# Patient Record
Sex: FEMALE | Race: WHITE | NOT HISPANIC OR LATINO | Employment: OTHER | URBAN - NONMETROPOLITAN AREA
[De-identification: names, ages, dates, MRNs, and addresses within clinical notes are randomized per-mention and may not be internally consistent; named-entity substitution may affect disease eponyms.]

---

## 2017-01-09 ENCOUNTER — OFFICE VISIT - GICH (OUTPATIENT)
Dept: OBGYN | Facility: OTHER | Age: 77
End: 2017-01-09

## 2017-01-09 ENCOUNTER — HISTORY (OUTPATIENT)
Dept: OBGYN | Facility: OTHER | Age: 77
End: 2017-01-09

## 2017-01-09 DIAGNOSIS — N39.3 STRESS INCONTINENCE: ICD-10-CM

## 2017-01-09 ASSESSMENT — PATIENT HEALTH QUESTIONNAIRE - PHQ9: SUM OF ALL RESPONSES TO PHQ QUESTIONS 1-9: 3

## 2017-09-13 ENCOUNTER — OFFICE VISIT - GICH (OUTPATIENT)
Dept: OBGYN | Facility: OTHER | Age: 77
End: 2017-09-13

## 2017-09-13 ENCOUNTER — HISTORY (OUTPATIENT)
Dept: OBGYN | Facility: OTHER | Age: 77
End: 2017-09-13

## 2017-09-13 DIAGNOSIS — N39.3 STRESS INCONTINENCE: ICD-10-CM

## 2017-09-18 ENCOUNTER — COMMUNICATION - GICH (OUTPATIENT)
Dept: OBGYN | Facility: OTHER | Age: 77
End: 2017-09-18

## 2017-10-02 ENCOUNTER — COMMUNICATION - GICH (OUTPATIENT)
Dept: INTERNAL MEDICINE | Facility: OTHER | Age: 77
End: 2017-10-02

## 2017-10-02 ENCOUNTER — HISTORY (OUTPATIENT)
Dept: INTERNAL MEDICINE | Facility: OTHER | Age: 77
End: 2017-10-02

## 2017-10-02 ENCOUNTER — OFFICE VISIT - GICH (OUTPATIENT)
Dept: INTERNAL MEDICINE | Facility: OTHER | Age: 77
End: 2017-10-02

## 2017-10-02 DIAGNOSIS — D64.9 ANEMIA: ICD-10-CM

## 2017-10-02 DIAGNOSIS — Z01.818 ENCOUNTER FOR OTHER PREPROCEDURAL EXAMINATION: ICD-10-CM

## 2017-10-02 DIAGNOSIS — R94.31 ABNORMAL ELECTROCARDIOGRAM: ICD-10-CM

## 2017-10-02 DIAGNOSIS — R79.89 OTHER SPECIFIED ABNORMAL FINDINGS OF BLOOD CHEMISTRY: ICD-10-CM

## 2017-10-02 DIAGNOSIS — Z79.899 OTHER LONG TERM (CURRENT) DRUG THERAPY: ICD-10-CM

## 2017-10-02 DIAGNOSIS — R06.81 APNEA, NOT ELSEWHERE CLASSIFIED: ICD-10-CM

## 2017-10-02 DIAGNOSIS — Z23 ENCOUNTER FOR IMMUNIZATION: ICD-10-CM

## 2017-10-02 DIAGNOSIS — I73.9 PERIPHERAL VASCULAR DISEASE (H): ICD-10-CM

## 2017-10-02 LAB
ANION GAP - HISTORICAL: 9 (ref 5–18)
BUN SERPL-MCNC: 26 MG/DL (ref 7–25)
BUN/CREAT RATIO - HISTORICAL: 18
CALCIUM SERPL-MCNC: 9.3 MG/DL (ref 8.6–10.3)
CHLORIDE SERPLBLD-SCNC: 107 MMOL/L (ref 98–107)
CO2 SERPL-SCNC: 26 MMOL/L (ref 21–31)
CREAT SERPL-MCNC: 1.45 MG/DL (ref 0.7–1.3)
ERYTHROCYTE [DISTWIDTH] IN BLOOD BY AUTOMATED COUNT: 13 % (ref 11.5–15.5)
GFR IF NOT AFRICAN AMERICAN - HISTORICAL: 35 ML/MIN/1.73M2
GLUCOSE SERPL-MCNC: 93 MG/DL (ref 70–105)
HCT VFR BLD AUTO: 33 % (ref 33–51)
HEMOGLOBIN: 10.5 G/DL (ref 12–16)
MCH RBC QN AUTO: 29 PG (ref 26–34)
MCHC RBC AUTO-ENTMCNC: 31.8 G/DL (ref 32–36)
MCV RBC AUTO: 91 FL (ref 80–100)
PLATELET # BLD AUTO: 193 THOU/CU MM (ref 140–440)
PMV BLD: 9.3 FL (ref 6.5–11)
POTASSIUM SERPL-SCNC: 4.2 MMOL/L (ref 3.5–5.1)
RED BLOOD COUNT - HISTORICAL: 3.62 MIL/CU MM (ref 4–5.2)
SODIUM SERPL-SCNC: 142 MMOL/L (ref 133–143)
WHITE BLOOD COUNT - HISTORICAL: 4.8 THOU/CU MM (ref 4.5–11)

## 2017-10-02 ASSESSMENT — ANXIETY QUESTIONNAIRES
GAD7 TOTAL SCORE: 0
6. BECOMING EASILY ANNOYED OR IRRITABLE: NOT AT ALL
3. WORRYING TOO MUCH ABOUT DIFFERENT THINGS: NOT AT ALL
5. BEING SO RESTLESS THAT IT IS HARD TO SIT STILL: NOT AT ALL
1. FEELING NERVOUS, ANXIOUS, OR ON EDGE: NOT AT ALL
2. NOT BEING ABLE TO STOP OR CONTROL WORRYING: NOT AT ALL
7. FEELING AFRAID AS IF SOMETHING AWFUL MIGHT HAPPEN: NOT AT ALL
4. TROUBLE RELAXING: NOT AT ALL

## 2017-10-02 ASSESSMENT — PATIENT HEALTH QUESTIONNAIRE - PHQ9: SUM OF ALL RESPONSES TO PHQ QUESTIONS 1-9: 0

## 2017-10-18 ENCOUNTER — HISTORY (OUTPATIENT)
Dept: SURGERY | Facility: OTHER | Age: 77
End: 2017-10-18

## 2017-10-20 ENCOUNTER — HOSPITAL ENCOUNTER (OUTPATIENT)
Dept: RADIOLOGY | Facility: OTHER | Age: 77
End: 2017-10-20
Attending: INTERNAL MEDICINE

## 2017-10-20 ENCOUNTER — TRANSFERRED RECORDS (OUTPATIENT)
Dept: HEALTH INFORMATION MANAGEMENT | Facility: CLINIC | Age: 77
End: 2017-10-20

## 2017-10-20 ENCOUNTER — MEDICAL CORRESPONDENCE (OUTPATIENT)
Facility: CLINIC | Age: 77
End: 2017-10-20
Payer: MEDICARE

## 2017-10-20 ENCOUNTER — AMBULATORY - GICH (OUTPATIENT)
Dept: INTERNAL MEDICINE | Facility: OTHER | Age: 77
End: 2017-10-20

## 2017-10-20 DIAGNOSIS — Z01.818 ENCOUNTER FOR OTHER PREPROCEDURAL EXAMINATION: ICD-10-CM

## 2017-10-20 DIAGNOSIS — R94.31 ABNORMAL ELECTROCARDIOGRAM: ICD-10-CM

## 2017-10-20 DIAGNOSIS — I73.9 PERIPHERAL VASCULAR DISEASE (H): ICD-10-CM

## 2017-10-20 PROCEDURE — 93306 TTE W/DOPPLER COMPLETE: CPT | Mod: 26 | Performed by: INTERNAL MEDICINE

## 2017-10-26 ENCOUNTER — SURGERY (OUTPATIENT)
Dept: SURGERY | Facility: OTHER | Age: 77
End: 2017-10-26

## 2017-10-26 ENCOUNTER — HOSPITAL ENCOUNTER (OUTPATIENT)
Dept: SURGERY | Facility: OTHER | Age: 77
Discharge: HOME | End: 2017-10-26
Attending: OBSTETRICS & GYNECOLOGY | Admitting: OBSTETRICS & GYNECOLOGY

## 2017-10-26 DIAGNOSIS — N39.3 STRESS INCONTINENCE: ICD-10-CM

## 2017-12-28 NOTE — OR NURSING
Patient Information     Patient Name MRN Gail Gu [id number] Female 1940      OR Nursing by Sara Camp RN at 10/26/2017  9:05 AM     Author:  Sara Camp RN Service:  (none) Author Type:  NURS- Registered Nurse     Filed:  10/26/2017  9:44 AM Date of Service:  10/26/2017  9:05 AM Status:  Signed     :  Sara Camp RN (NURS- Registered Nurse)            Handoff report from Heidi Macdonald RN

## 2017-12-28 NOTE — TELEPHONE ENCOUNTER
Patient Information     Patient Name MRN Sex Gail Hilario [id number] Female 1940      Telephone Encounter by Sol Rice DO at 10/2/2017  6:13 PM     Author:  Sol Rice DO Service:  (none) Author Type:  PHYS- Osteopathic     Filed:  10/2/2017  6:15 PM Encounter Date:  10/2/2017 Status:  Signed     :  Sol Rice DO (PHYS- Osteopathic)            Please call patient and let her know that after looking at her chart overall along with her lower extremity edema and some of the changes on her EKG I do think it would be reasonable to get an echocardiogram prior to her surgery.  If she is okay with this I will place the order.    Also, please let patient know her electrolytes look okay.  Her kidney function is slightly elevated and she should have ongoing monitoring of this including after surgery. Hemoglobin and blood counts are stable from 1 year ago. Call if she has any questions.

## 2017-12-28 NOTE — PROGRESS NOTES
Patient Information     Patient Name MRN Gail Gu [id number] Female 1940      Progress Notes by Sol Rice DO at 10/2/2017 10:20 AM     Author:  Sol Rice DO Service:  (none) Author Type:  PHYS- Osteopathic     Filed:  10/6/2017  7:37 AM Encounter Date:  10/2/2017 Status:  Signed     :  Sol Rice DO (PHYS- Osteopathic)            Please see H&P from same date.

## 2017-12-28 NOTE — INTERVAL H&P NOTE
Patient Information     Patient Name MRN Gail Gu [id number] Female 1940      Interval H&P Note by Boris Guerra MD at 10/26/2017  8:40 AM     Author:  Boris Guerra MD Service:  (none) Author Type:  Physician     Filed:  10/26/2017  8:40 AM Date of Service:  10/26/2017  8:40 AM Status:  Signed     :  Boris Guerra MD (Physician)            I have seen and examined the patient and there are no changes to her history and physical exam.    Boris Guerra MD        Source Note     Author:  Sol Rice DO Service:  (none) Author Type:  PHYS- Osteopathic    Filed:  10/24/2017 11:51 AM Date of Service:  10/2/2017 10:20 AM Status:  Signed    :  Sol Rice DO (PHYS- Osteopathic)              ----------------- PREOPERATIVE EXAM ------------------  10/2/2017    HPI:  Gail Perry is a 77 y.o. female here for preoperative optimization requested by Dr. Guerra due to medical comorbidity including history of anemia, peripheral vascular disease, chronic pain.  She overall is feeling good and denies any concerns.  She has not had any new chest pain, SOB, palpitations, dyspnea on exertion, cough/cold symptoms, orthopnea or LE edema.  She is not limited in exercise due to chest pain, SOB or other cardiopulmonary symptoms although is limited in the amount of activity she participates in.    She does have a history of peripheral vascular disease.  She is currently on methadone for her pain.  She does report that the decrease in sensation is on her feet and up to her mid calf.  She denies any sores or concerns in this area although does have a history of osteomyelitis with ulceration and MRSA.  He does feel that the methadone controls her pain at this time and she does take 30-40 mg twice daily as needed.  She does have a prior EKG that shows inferior infarct that was age indeterminate.  This was back in .    She has a history of anemia.  Outside records are  reviewed.  Her hemoglobin was last checked a year ago.  At that time it was 10.5.  This has been stable for her since approximately 2011.  She was on iron replacement for a period of time however no longer is taking this.    She does report today that she has been having some episodes of apnea.  She does report having done a polysomnogram and was told she had borderline sleep apnea however she was never prescribed a CPAP.    Date of Surgery: October 26, 2017  Type of Surgery: Placement of a bladder neck sling  Surgeon: Dr. Guerra  [facility]:  [facility] and hospital    Fever/Chills or other infectious symptoms in past month? no  >10lbs weight loss in the past 2 months? no  No prolonged steroid use in past year.  Recent use of: aspirin  Health Care Directive on file? no  History of blood transfusions? Yes - tolerated in past  Td up to date? 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  Yes - she has had some difficulty awakening from anesthetic however has not had any issues since the early 1980s  History of blood clots, bleeding issues or anesthetic problems in immediate family members?  no    Obstructive Sleep Apnea screening    History of diagnosis of MARYAM?  Reportedly borderline  Do you snore loudly? yes  Has anyone ever observed you stop breathing during your sleep? yes   Do you have or are you being treated for high blood pressure? yes    Is BMI >35? no  Is Pt > 50 years old? yes  Neck circumference > > 15.75 inches/40cm? yes  Male? no     Total yes answers in MARYAM section: 5    Low Risk 0-2  At Risk 3-4  High Risk 5-8        ROS  GEN: -fevers/-chills/-night sweats/-wt change  NEURO: -headaches/-vision changes  EARS: -hearing/-tinnitus  NOSE: -drainage/-congestion  MOUTH/THROAT: - sore throat/-dysphagia/-sores  LUNGS: -sob/-cough  CARDIOVASCULAR: -cp/-palpitations  GI: -pain/-diarrhea/-constipation/-bloody stools  :  -dysuria/-hematuria  HEMATOLOGIC/LYMPHATIC: -swollen nodes/-easy bruising  SKIN: -rashes/-lesions  MSK/RHEUM: +joint pain/-swelling/-falls  NEURO: -weakness/+parasthesias  PSYCH: -anhedonia/+depression/-anxiety        History is discussed on 10/2/2017 with patient and reviewed in available records by myself.  It is current to the best of my knowledge as below.    Past Medical History:     Diagnosis  Date     Anemia      Anesthesia     hx difficulty awakening from surgery many years ago (1980)- no problem with recent surgeries      Bilateral bunions     surgery for right      COPD (chronic obstructive pulmonary disease) (HC)     mild      Depression      Epiretinal membrane, right eye      GERD (gastroesophageal reflux disease)     gastritis      History of blood transfusion 1980    with hysterectomy      HSV (herpes simplex virus) infection     cold sores - none for years      HTN (hypertension)      Hyperlipidemia      Hypothyroidism      Intermittent claudication (HC)      Lumbar back pain      Macular degeneration     OD      MRSA (methicillin resistant Staphylococcus aureus) 8/2011    toe L foot ulcerations      Nevus of neck     right neck      Osteoarthrosis 2012    right femoral head, resection      Osteopenia      Peripheral neuropathy, idiopathic     ideopathic- chronic pain contract- appts every other month; feet and legs      Peripheral vascular disease (HC)     in bilateral legs      Renal failure      Thyroid nodule     noted on US 2/2012, repeat 11/2012 and stable- no f/u recommended      Urinary frequency 2006    urinary incontinence        Past Surgical History:      Procedure  Laterality Date     ANTERIOR AND POSTERIOR VAGINAL REPAIR      x2; done in [facility]        APPENDECTOMY       BIOPSY  9/14/2012     gastric mild reactive gastropathy, neg for H-pylori       BUNIONECTOMY  10/06    Right foot- [facility]       CHOLECYSTECTOMY  9/2010    [facility]       COLONOSCOPY  2/05, 2010      ESOPHAGOGASTRODUODENOSCOPY  9/13/2012    Dr. Main Bullock- for GI bleed       KNEE ARTHROSCOPY Left 2008     PREMALIG/BENIGN SKIN LESION EXCISION  4/2001    excision nevus right neck        right femoral head resection  5/7/2012    from osteoarthrosis       ELISE AND BSO  1980    bleeding- with hx of blood transfusion       TOE AMPUTATION Left 08/20/2015    Left foot lesser toe - Gwendolyn, 2/24/2016 - Dr Yee       TOTAL HIP ARTHROPLASTY Right 05/07/2012       Current Outpatient Prescriptions     Medication  Sig     acetaminophen (TYLENOL) 325 mg tablet Take 650 mg by mouth every 6 hours if needed. Max acetaminophen dose: 4000mg in 24 hrs.     albuterol (PROVENTIL) 0.083 % neb solution Inhale 3 mL via a nebulizer 4 times daily.     aspirin enteric coated 81 mg tablet Take 81 mg by mouth once daily with a meal.     Calcium-Cholecalciferol, D3, 600 mg calcium- 200 unit cap Take 1 Tab by mouth 2 times daily.     docusate (COLACE) 100 mg tablet Take 100 mg by mouth once daily. Indications: CONSTIPATION     furosemide (LASIX) 20 mg tablet Take 1 tablet by mouth once daily if needed for Other (Specify).     levothyroxine (SYNTHROID) 75 mcg tablet Take 75 mcg by mouth once daily.     lisinopril (PRINIVIL; ZESTRIL) 20 mg tablet Take 20 mg by mouth once daily.     methadone (DOLOPHINE) 10 mg tablet 30-40mg twice daily as needed for pain.     omeprazole (PRILOSEC) 20 mg capsule Take 20 mg by mouth once daily before a meal.     PARoxetine (PAXIL) 40 mg tablet Take 40 mg by mouth once daily.     polyethylene glycoL (MIRALAX) 17 gram/dose powder Take 17 g by mouth once daily.     tiotropium (SPIRIVA) 18 mcg inhalation capsule Inhale 18 mcg by mouth once daily. As needed.     No current facility-administered medications for this visit.      Medications have been reviewed by me and are current to the best of my knowledge and ability.       Allergies      Allergen   Reactions     Latex  Hives and Angioedema     Affected around mouth  "while at dental office (when latex gloves used)      Nickel  *Unknown     rash      Penicillins  Hives     Sulfa (Sulfonamide Antibiotics)  Other - Describe In Comment Field     Yellow around eyes; ? Liver issues        Social History     Social History        Marital status:       Spouse name: N/A     Number of children:  N/A     Years of education:  N/A     Social History Main Topics         Smoking status:   Never Smoker     Smokeless tobacco:   Never Used     Alcohol use   Yes      Comment: once a year      Drug use:   No     Sexual activity:   Not Currently     Other Topics  Concern     Not on file      Social History Narrative     Lives with her , Guero.  Lives in [place].  Three daughters, two live [place] of [place] and one in [facility].               Family Status     Relation  Status     Mother      Father      Daughter Alive     Sister      Daughter Alive     Daughter Alive       Family History       Problem   Relation Age of Onset     Heart Disease  Mother      Hypertension  Father      Other  Father      Pulmonary Emboli       No Known Problems  Daughter      Cancer-breast  Sister      Other  Sister      adopted sister       Cancer-breast  Daughter      total mastectomy Age 36       Other  Daughter      OP cerebellar atrophy         -------------------------------------------------------------    PHYSICAL EXAM:  /90  Pulse 64  Ht 1.562 m (5' 1.5\")  Wt 83 kg (182 lb 14.4 oz)  SpO2 91%  BMI 34 kg/m2  Estimated body mass index is 34 kg/(m^2) as calculated from the following:    Height as of this encounter: 1.562 m (5' 1.5\").    Weight as of this encounter: 83 kg (182 lb 14.4 oz).      GEN: Vitals reviewed.  Patient is in no acute distress.  Cooperative with exam.  HEENT: Normocephalic atraumatic. Normal external eye, conjunctiva, lids, cornea. Pupils equally round. No scleral icterus or conjunctival erythema. Oropharynx with no erythema or exudates or " sores.   NECK: Supple; no thyromegaly or masses noted.  No cervical or supraclavicular lymphadenopathy.  CV: Heart regular in rate and rhythm with no murmur. Radial and posterior tibial pulses palpable.  LUNGS: Lungs clear to auscultation bilaterally.  Chest rise equal bilaterally. No accessory muscle use.  ABD: No CVA tenderness. Soft, nontender, and nondistended. No rebound. Bowel sounds positive.  SKIN: Warm and dry to touch.  No rash on face, arms and legs.  EXT: No clubbing or cyanosis.  Trace to 1 plus bilateral lower extremity peripheral edema.  Worse on the right than the left.    NEURO: Alert and oriented to person, place, and time. Cranial nerves II-XII grossly intact with no focal or lateralizing deficits.  Muscle tone normal.  Gait normal. No tremor.  Sensation intact to light touch.  MSK: Back is straight, full ROM of upper and lower extremities.  PSYCH: Affect appropriate.  Appropriate and linear thought processes.      CXR:  Not indicated    EKG: 10/2/2017 -Personally ordered/reviewed - Normal sinus rhythm with a rate of 64.  Q waves noted in leads 3 and aVF.  ST segment abnormality with T-wave inversion in V2 and V3 and flattening through V5.  QRS, QTC and NC intervals are all normal.  Possible inferior infarct, age indeterminate.  This is unchanged from 2015.    LABS: Personally ordered and reviewed as below.  ---------------------------------------------------------------  Results for orders placed or performed in visit on 10/02/17      CBC W PLT NO DIFF      Result  Value Ref Range    WHITE BLOOD COUNT         4.8 4.5 - 11.0 thou/cu mm    RED BLOOD COUNT           3.62 (L) 4.00 - 5.20 mil/cu mm    HEMOGLOBIN                10.5 (L) 12.0 - 16.0 g/dL    HEMATOCRIT                33.0 33.0 - 51.0 %    MCV                       91 80 - 100 fL    MCH                       29.0 26.0 - 34.0 pg    MCHC                      31.8 (L) 32.0 - 36.0 g/dL    RDW                       13.0 11.5 - 15.5 %     PLATELET COUNT            193 140 - 440 thou/cu mm    MPV                       9.3 6.5 - 11.0 fL   BASIC METABOLIC PANEL      Result  Value Ref Range    SODIUM 142 133 - 143 mmol/L    POTASSIUM 4.2 3.5 - 5.1 mmol/L    CHLORIDE 107 98 - 107 mmol/L    CO2,TOTAL 26 21 - 31 mmol/L    ANION GAP 9 5 - 18                    GLUCOSE 93 70 - 105 mg/dL    CALCIUM 9.3 8.6 - 10.3 mg/dL    BUN 26 (H) 7 - 25 mg/dL    CREATININE 1.45 (H) 0.70 - 1.30 mg/dL    BUN/CREAT RATIO           18                    GFR if African American 43 (L) >60 ml/min/1.73m2    GFR if not African American 35 (L) >60 ml/min/1.73m2       ASSESSEMENT AND PLAN:    Preoperative examination  - Patient is able to tolerate greater than 4 METs of activity without any cardiopulmonary symptoms.   - The patient has RCRI risk factors of 1  - ASA PS class 2  - at this time given that she has had abnormalities on her EKG since 2015 with no further follow-up I do think it is reasonable to obtain an echocardiogram.  If this is normal with no significant wall motion abnormalities I would not recommend any further testing at this time.  If significant abnormality is noted it would be recommended to be seen through cardiology prior to surgery.    PRE OP RECOMMENDATIONS:  - Patient is on chronic pain medications (YES) and plan is decrease as able prior to surgery.  Resume treatment with her primary care provider postop.  - Patient is on antiplatlet/anticoagulation (YES) and plan is hold prior  - Other medications that need adjustment perioperatively (NO) - although it was recommended to try to reduce her methadone dose leading up to surgery.   - Other: Patient was advised to call our office and the surgical services with any change in condition, concerns or new symptoms that develop between today and their surgical date. Especially any cardiopulmonary symptoms or symptoms concerning for an infection.      Diagnoses and all orders for this visit:    Preoperative  examination  -     MS ELECTROCARDIOGRAM TRACING  -     EKG 12 LEAD UNIT PERFORMED  -     ECHO COMPLETE W CONTRAST; Future    Anemia, unspecified type  -     stable from prior at this time.  -     CBC W PLT NO DIFF    Apnea  - given her episodes of apnea she very well may need post op noninvasive ventilation.  I question if this is secondary to her methadone and medication use.  I would recommend reducing these as able long-term and considering a polysomnogram at some point in the future through her primary care provider.    PVD (peripheral vascular disease) (HC)  -     MS ELECTROCARDIOGRAM TRACING  -     EKG 12 LEAD UNIT PERFORMED  -     ECHO COMPLETE W CONTRAST; Future    Abnormal EKG  - echo ordered due to persistent abnormal EKG with no interval cardiac workup as above.      - ECHO COMPLETE W CONTRAST; Future    Elevated serum creatinine        - she is encouraged to increase her water intake overall within reason.  She needs to avoid any nonsteroidal anti-inflammatory medications.  I would recommend postop assessment of renal function.    High risk medication use  -     BASIC METABOLIC PANEL    Need for influenza vaccination  -     FLU VACCINE => 65 YRS HIGH DOSE TRIVALENT IIV3 IM  -     MS ADMIN VACC INITIAL        Patient Instructions   PRE OP RECOMMENDATIONS:  -- Hold aspirin for 7 days before surgery  -- Hold Advil/ibuprofen, Aleve/naproxen, for 7 days prior to surgery  -- Hold vitamins and supplements for 2 weeks prior to surgery  -- No need to hold regular meds the day of surgery.  Consider decreasing methadone to 30mg twice daily for the week leading up to surgery if able  -- Okay to use Tylenol (Acetaminophen)  -- No food or drink after midnight the night before surgery    Please call our office and the surgical services with any change in condition or new symptoms that develop between today and your surgical date, in particular if you have any new chest pain, shortness of breath, swelling or fevers.            KLEVER CAZARES DO  10/2/2017 11:14 AM      ADDENDUM:  10/24/2017   11:50 AM    Echocardiogram is reviewed.  No wall motion abnormalities or other concerns noted.  Patient is scheduled for surgery later this week and there is no further intervention needed prior.    KLEVER CAZARES DO

## 2017-12-28 NOTE — H&P
Patient Information     Patient Name MRN Gail Gu [id number] Female 1940      H&P (View-Only) by Sol Rice DO at 10/2/2017 10:20 AM     Author:  Sol Rice DO Service:  (none) Author Type:  PHYS- Osteopathic     Filed:  10/24/2017 11:51 AM Date of Service:  10/2/2017 10:20 AM Status:  Signed     :  Sol Rice DO (PHYS- Osteopathic)            ----------------- PREOPERATIVE EXAM ------------------  10/2/2017    HPI:  Gail Perry is a 77 y.o. female here for preoperative optimization requested by Dr. Guerra due to medical comorbidity including history of anemia, peripheral vascular disease, chronic pain.  She overall is feeling good and denies any concerns.  She has not had any new chest pain, SOB, palpitations, dyspnea on exertion, cough/cold symptoms, orthopnea or LE edema.  She is not limited in exercise due to chest pain, SOB or other cardiopulmonary symptoms although is limited in the amount of activity she participates in.    She does have a history of peripheral vascular disease.  She is currently on methadone for her pain.  She does report that the decrease in sensation is on her feet and up to her mid calf.  She denies any sores or concerns in this area although does have a history of osteomyelitis with ulceration and MRSA.  He does feel that the methadone controls her pain at this time and she does take 30-40 mg twice daily as needed.  She does have a prior EKG that shows inferior infarct that was age indeterminate.  This was back in .    She has a history of anemia.  Outside records are reviewed.  Her hemoglobin was last checked a year ago.  At that time it was 10.5.  This has been stable for her since approximately .  She was on iron replacement for a period of time however no longer is taking this.    She does report today that she has been having some episodes of apnea.  She does report having done a polysomnogram and was told she had  borderline sleep apnea however she was never prescribed a CPAP.    Date of Surgery: October 26, 2017  Type of Surgery: Placement of a bladder neck sling  Surgeon: Dr. Guerra  [facility]:  [facility] and hospital    Fever/Chills or other infectious symptoms in past month? no  >10lbs weight loss in the past 2 months? no  No prolonged steroid use in past year.  Recent use of: aspirin  Health Care Directive on file? no  History of blood transfusions? Yes - tolerated in past  Td up to date? 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  Yes - she has had some difficulty awakening from anesthetic however has not had any issues since the early 1980s  History of blood clots, bleeding issues or anesthetic problems in immediate family members?  no    Obstructive Sleep Apnea screening    History of diagnosis of MARYAM?  Reportedly borderline  Do you snore loudly? yes  Has anyone ever observed you stop breathing during your sleep? yes   Do you have or are you being treated for high blood pressure? yes    Is BMI >35? no  Is Pt > 50 years old? yes  Neck circumference > > 15.75 inches/40cm? yes  Male? no     Total yes answers in MARYAM section: 5    Low Risk 0-2  At Risk 3-4  High Risk 5-8        ROS  GEN: -fevers/-chills/-night sweats/-wt change  NEURO: -headaches/-vision changes  EARS: -hearing/-tinnitus  NOSE: -drainage/-congestion  MOUTH/THROAT: - sore throat/-dysphagia/-sores  LUNGS: -sob/-cough  CARDIOVASCULAR: -cp/-palpitations  GI: -pain/-diarrhea/-constipation/-bloody stools  : -dysuria/-hematuria  HEMATOLOGIC/LYMPHATIC: -swollen nodes/-easy bruising  SKIN: -rashes/-lesions  MSK/RHEUM: +joint pain/-swelling/-falls  NEURO: -weakness/+parasthesias  PSYCH: -anhedonia/+depression/-anxiety        History is discussed on 10/2/2017 with patient and reviewed in available records by myself.  It is current to the best of my knowledge as below.    Past  Medical History:     Diagnosis  Date     Anemia      Anesthesia     hx difficulty awakening from surgery many years ago (1980)- no problem with recent surgeries      Bilateral bunions     surgery for right      COPD (chronic obstructive pulmonary disease) (HC)     mild      Depression      Epiretinal membrane, right eye      GERD (gastroesophageal reflux disease)     gastritis      History of blood transfusion 1980    with hysterectomy      HSV (herpes simplex virus) infection     cold sores - none for years      HTN (hypertension)      Hyperlipidemia      Hypothyroidism      Intermittent claudication (HC)      Lumbar back pain      Macular degeneration     OD      MRSA (methicillin resistant Staphylococcus aureus) 8/2011    toe L foot ulcerations      Nevus of neck     right neck      Osteoarthrosis 2012    right femoral head, resection      Osteopenia      Peripheral neuropathy, idiopathic     ideopathic- chronic pain contract- appts every other month; feet and legs      Peripheral vascular disease (HC)     in bilateral legs      Renal failure      Thyroid nodule     noted on US 2/2012, repeat 11/2012 and stable- no f/u recommended      Urinary frequency 2006    urinary incontinence        Past Surgical History:      Procedure  Laterality Date     ANTERIOR AND POSTERIOR VAGINAL REPAIR      x2; done in [facility]        APPENDECTOMY       BIOPSY  9/14/2012     gastric mild reactive gastropathy, neg for H-pylori       BUNIONECTOMY  10/06    Right foot- Pawel       CHOLECYSTECTOMY  9/2010    Ara       COLONOSCOPY  2/05, 2010     ESOPHAGOGASTRODUODENOSCOPY  9/13/2012    Dr. Main Bullock- for GI bleed       KNEE ARTHROSCOPY Left 2008     PREMALIG/BENIGN SKIN LESION EXCISION  4/2001    excision nevus right neck        right femoral head resection  5/7/2012    from osteoarthrosis       ELISE AND BSO  1980    bleeding- with hx of blood transfusion       TOE AMPUTATION Left 08/20/2015    Left foot lesser toe -  Gwendolyn, 2/24/2016 - Dr Yee       TOTAL HIP ARTHROPLASTY Right 05/07/2012       Current Outpatient Prescriptions     Medication  Sig     acetaminophen (TYLENOL) 325 mg tablet Take 650 mg by mouth every 6 hours if needed. Max acetaminophen dose: 4000mg in 24 hrs.     albuterol (PROVENTIL) 0.083 % neb solution Inhale 3 mL via a nebulizer 4 times daily.     aspirin enteric coated 81 mg tablet Take 81 mg by mouth once daily with a meal.     Calcium-Cholecalciferol, D3, 600 mg calcium- 200 unit cap Take 1 Tab by mouth 2 times daily.     docusate (COLACE) 100 mg tablet Take 100 mg by mouth once daily. Indications: CONSTIPATION     furosemide (LASIX) 20 mg tablet Take 1 tablet by mouth once daily if needed for Other (Specify).     levothyroxine (SYNTHROID) 75 mcg tablet Take 75 mcg by mouth once daily.     lisinopril (PRINIVIL; ZESTRIL) 20 mg tablet Take 20 mg by mouth once daily.     methadone (DOLOPHINE) 10 mg tablet 30-40mg twice daily as needed for pain.     omeprazole (PRILOSEC) 20 mg capsule Take 20 mg by mouth once daily before a meal.     PARoxetine (PAXIL) 40 mg tablet Take 40 mg by mouth once daily.     polyethylene glycoL (MIRALAX) 17 gram/dose powder Take 17 g by mouth once daily.     tiotropium (SPIRIVA) 18 mcg inhalation capsule Inhale 18 mcg by mouth once daily. As needed.     No current facility-administered medications for this visit.      Medications have been reviewed by me and are current to the best of my knowledge and ability.       Allergies      Allergen   Reactions     Latex  Hives and Angioedema     Affected around mouth while at dental office (when latex gloves used)      Nickel  *Unknown     rash      Penicillins  Hives     Sulfa (Sulfonamide Antibiotics)  Other - Describe In Comment Field     Yellow around eyes; ? Liver issues        Social History     Social History        Marital status:       Spouse name: N/A     Number of children:  N/A     Years of education:  N/A     Social  "History Main Topics         Smoking status:   Never Smoker     Smokeless tobacco:   Never Used     Alcohol use   Yes      Comment: once a year      Drug use:   No     Sexual activity:   Not Currently     Other Topics  Concern     Not on file      Social History Narrative     Lives with her , Guero.  Lives in [place].  Three daughters, two live north of [place] and one in the [facility].               Family Status     Relation  Status     Mother      Father      Daughter Alive     Sister      Daughter Alive     Daughter Alive       Family History       Problem   Relation Age of Onset     Heart Disease  Mother      Hypertension  Father      Other  Father      Pulmonary Emboli       No Known Problems  Daughter      Cancer-breast  Sister      Other  Sister      adopted sister       Cancer-breast  Daughter      total mastectomy Age 36       Other  Daughter      OP cerebellar atrophy         -------------------------------------------------------------    PHYSICAL EXAM:  /90  Pulse 64  Ht 1.562 m (5' 1.5\")  Wt 83 kg (182 lb 14.4 oz)  SpO2 91%  BMI 34 kg/m2  Estimated body mass index is 34 kg/(m^2) as calculated from the following:    Height as of this encounter: 1.562 m (5' 1.5\").    Weight as of this encounter: 83 kg (182 lb 14.4 oz).      GEN: Vitals reviewed.  Patient is in no acute distress.  Cooperative with exam.  HEENT: Normocephalic atraumatic. Normal external eye, conjunctiva, lids, cornea. Pupils equally round. No scleral icterus or conjunctival erythema. Oropharynx with no erythema or exudates or sores.   NECK: Supple; no thyromegaly or masses noted.  No cervical or supraclavicular lymphadenopathy.  CV: Heart regular in rate and rhythm with no murmur. Radial and posterior tibial pulses palpable.  LUNGS: Lungs clear to auscultation bilaterally.  Chest rise equal bilaterally. No accessory muscle use.  ABD: No CVA tenderness. Soft, nontender, and nondistended. No " rebound. Bowel sounds positive.  SKIN: Warm and dry to touch.  No rash on face, arms and legs.  EXT: No clubbing or cyanosis.  Trace to 1 plus bilateral lower extremity peripheral edema.  Worse on the right than the left.    NEURO: Alert and oriented to person, place, and time. Cranial nerves II-XII grossly intact with no focal or lateralizing deficits.  Muscle tone normal.  Gait normal. No tremor.  Sensation intact to light touch.  MSK: Back is straight, full ROM of upper and lower extremities.  PSYCH: Affect appropriate.  Appropriate and linear thought processes.      CXR:  Not indicated    EKG: 10/2/2017 -Personally ordered/reviewed - Normal sinus rhythm with a rate of 64.  Q waves noted in leads 3 and aVF.  ST segment abnormality with T-wave inversion in V2 and V3 and flattening through V5.  QRS, QTC and WI intervals are all normal.  Possible inferior infarct, age indeterminate.  This is unchanged from 2015.    LABS: Personally ordered and reviewed as below.  ---------------------------------------------------------------  Results for orders placed or performed in visit on 10/02/17      CBC W PLT NO DIFF      Result  Value Ref Range    WHITE BLOOD COUNT         4.8 4.5 - 11.0 thou/cu mm    RED BLOOD COUNT           3.62 (L) 4.00 - 5.20 mil/cu mm    HEMOGLOBIN                10.5 (L) 12.0 - 16.0 g/dL    HEMATOCRIT                33.0 33.0 - 51.0 %    MCV                       91 80 - 100 fL    MCH                       29.0 26.0 - 34.0 pg    MCHC                      31.8 (L) 32.0 - 36.0 g/dL    RDW                       13.0 11.5 - 15.5 %    PLATELET COUNT            193 140 - 440 thou/cu mm    MPV                       9.3 6.5 - 11.0 fL   BASIC METABOLIC PANEL      Result  Value Ref Range    SODIUM 142 133 - 143 mmol/L    POTASSIUM 4.2 3.5 - 5.1 mmol/L    CHLORIDE 107 98 - 107 mmol/L    CO2,TOTAL 26 21 - 31 mmol/L    ANION GAP 9 5 - 18                    GLUCOSE 93 70 - 105 mg/dL    CALCIUM 9.3 8.6 - 10.3 mg/dL     BUN 26 (H) 7 - 25 mg/dL    CREATININE 1.45 (H) 0.70 - 1.30 mg/dL    BUN/CREAT RATIO           18                    GFR if African American 43 (L) >60 ml/min/1.73m2    GFR if not African American 35 (L) >60 ml/min/1.73m2       ASSESSEMENT AND PLAN:    Preoperative examination  - Patient is able to tolerate greater than 4 METs of activity without any cardiopulmonary symptoms.   - The patient has RCRI risk factors of 1  - ASA PS class 2  - at this time given that she has had abnormalities on her EKG since 2015 with no further follow-up I do think it is reasonable to obtain an echocardiogram.  If this is normal with no significant wall motion abnormalities I would not recommend any further testing at this time.  If significant abnormality is noted it would be recommended to be seen through cardiology prior to surgery.    PRE OP RECOMMENDATIONS:  - Patient is on chronic pain medications (YES) and plan is decrease as able prior to surgery.  Resume treatment with her primary care provider postop.  - Patient is on antiplatlet/anticoagulation (YES) and plan is hold prior  - Other medications that need adjustment perioperatively (NO) - although it was recommended to try to reduce her methadone dose leading up to surgery.   - Other: Patient was advised to call our office and the surgical services with any change in condition, concerns or new symptoms that develop between today and their surgical date. Especially any cardiopulmonary symptoms or symptoms concerning for an infection.      Diagnoses and all orders for this visit:    Preoperative examination  -     MA ELECTROCARDIOGRAM TRACING  -     EKG 12 LEAD UNIT PERFORMED  -     ECHO COMPLETE W CONTRAST; Future    Anemia, unspecified type  -     stable from prior at this time.  -     CBC W PLT NO DIFF    Apnea  - given her episodes of apnea she very well may need post op noninvasive ventilation.  I question if this is secondary to her methadone and medication use.  I would  recommend reducing these as able long-term and considering a polysomnogram at some point in the future through her primary care provider.    PVD (peripheral vascular disease) (HC)  -     PA ELECTROCARDIOGRAM TRACING  -     EKG 12 LEAD UNIT PERFORMED  -     ECHO COMPLETE W CONTRAST; Future    Abnormal EKG  - echo ordered due to persistent abnormal EKG with no interval cardiac workup as above.      - ECHO COMPLETE W CONTRAST; Future    Elevated serum creatinine        - she is encouraged to increase her water intake overall within reason.  She needs to avoid any nonsteroidal anti-inflammatory medications.  I would recommend postop assessment of renal function.    High risk medication use  -     BASIC METABOLIC PANEL    Need for influenza vaccination  -     FLU VACCINE => 65 YRS HIGH DOSE TRIVALENT IIV3 IM  -     PA ADMIN VACC INITIAL        Patient Instructions   PRE OP RECOMMENDATIONS:  -- Hold aspirin for 7 days before surgery  -- Hold Advil/ibuprofen, Aleve/naproxen, for 7 days prior to surgery  -- Hold vitamins and supplements for 2 weeks prior to surgery  -- No need to hold regular meds the day of surgery.  Consider decreasing methadone to 30mg twice daily for the week leading up to surgery if able  -- Okay to use Tylenol (Acetaminophen)  -- No food or drink after midnight the night before surgery    Please call our office and the surgical services with any change in condition or new symptoms that develop between today and your surgical date, in particular if you have any new chest pain, shortness of breath, swelling or fevers.           KLEVER CAZARES DO  10/2/2017 11:14 AM      ADDENDUM:  10/24/2017   11:50 AM    Echocardiogram is reviewed.  No wall motion abnormalities or other concerns noted.  Patient is scheduled for surgery later this week and there is no further intervention needed prior.    KLEVER CAZARES DO

## 2017-12-28 NOTE — PROGRESS NOTES
Patient Information     Patient Name MRN Sex Gali Hilario [id number] Female 1940      Progress Notes by Boris Guerra MD at 2017 10:15 AM     Author:  Boris Guerra MD Service:  (none) Author Type:  Physician     Filed:  2017 11:38 AM Encounter Date:  2017 Status:  Signed     :  Boris Guerra MD (Physician)            URINARY INCONTINENCE - GYN    Gail Perry is a 77 y.o. female, who returns with a persistent complaint of urinary incontinence for a duration of many years.  She has been seen by Dr Joy in Urology and underwent urodynamics.  The results showed mixed incontinence with primarily a stress component.  We met last January and reviewed her workup.  We also discussed options for care and she would like to proceed with a mini-arc mid-urethral sling.    SUBJECTIVE    Symptoms occur when laughing, coughing or sneezing.  Urine stream is described as weak and considered by the patient as worsening.    Are panty liners, pads or disposable undergarments required?  Yes    Associated Symptoms    Dysuria:  No  Vaginal Irritation/Dryness:  No  Hematuria:  No  Nocturia:  Yes - few times a night  Urgency:  No  Fever:  No    Previous tests/procedures performed were voiding cystourethrogram    Past Medical History includes neurologic disease.      Past Medical History:     Diagnosis  Date     Anemia      Anesthesia     hx difficulty awakening from surgery many years ago ()- no problem with recent surgeries      Bilateral bunions     surgery for right      Chronic pain      COPD (chronic obstructive pulmonary disease) (HC)     mild      Depression      Epiretinal membrane, right eye      GERD (gastroesophageal reflux disease)     gastritis      History of blood transfusion     with hysterectomy      History of blood transfusion     with hysterectomy      HSV (herpes simplex virus) infection      HTN (hypertension)      Hyperlipidemia       "Hypothyroidism      Intermittent claudication (HC)      Lumbar back pain      Macular degeneration     OD      MRSA (methicillin resistant Staphylococcus aureus) 8/2011    toe L foot ulcerations      Nevus of neck     right neck      Osteoarthrosis 2012    right femoral head, resection      Osteopenia      Peripheral neuropathy (HC)     Feet      Peripheral neuropathy, idiopathic     ideopathic- chronic pain contract- appts every other month      Peripheral vascular disease (HC)      Renal failure      Renal insufficiency     Progressed to renal failure      Thyroid nodule     noted on US 2/2012, repeat 11/2012 and stable- no f/u recommended      Urinary frequency 2006    urinary incontinence      UTI (urinary tract infection)        OBJECTIVE    ROS    See HPI.     PHYSICAL EXAM    /90  Pulse 72  Ht 1.588 m (5' 2.5\")  Wt 82.5 kg (181 lb 12.8 oz)  BMI 32.72 kg/m2  General Appearance:  Alert, appropriate appearance for age. No acute distress  Psychiatric Exam: Alert and oriented, appropriate affect.    ASSESSMENT    The clinical evaluation of the patient's signs and symptoms indicate that the urinary incontinence may be due to stress incontinence.    PLAN    Schedule for extended stay versus outpatient surgery.  Due to her advanced years and medical problems, I asked her to see Dr Rice for pre-operative optimization and recommendations for mayte-operative care.  She agrees with this plan.        "

## 2017-12-28 NOTE — OR ANESTHESIA
Patient Information     Patient Name MRN Sex     Gail Perry [id number] Female 1940      OR Anesthesia by Elsi Gonzalez MD at 10/26/2017  8:50 AM     Author:  Elsi Gonzalez MD Service:  (none) Author Type:  PHYS- Anesthesiologist     Filed:  10/26/2017  8:51 AM Date of Service:  10/26/2017  8:50 AM Status:  Signed     :  Elsi Gonzalez MD (PHYS- Anesthesiologist)                                                           ANESTHESIA ASSESSMENT    Date: 10/26/17 Time: 8:50 AM      Patient:  Gail Perry    Procedure(s) (LRB):  Place Bladder Neck Sling (N/A)    Past Medical History:     Diagnosis  Date     Anemia      Anesthesia     hx difficulty awakening from surgery many years ago ()- no problem with recent surgeries      Bilateral bunions     surgery for right      COPD (chronic obstructive pulmonary disease) (HC)     mild      Depression      Epiretinal membrane, right eye      GERD (gastroesophageal reflux disease)     gastritis      History of blood transfusion     with hysterectomy      HSV (herpes simplex virus) infection     cold sores - none for years      HTN (hypertension)      Hyperlipidemia      Hypothyroidism      Intermittent claudication (HC)      Lumbar back pain      Macular degeneration     OD      MRSA (methicillin resistant Staphylococcus aureus) 2011    toe L foot ulcerations      Nevus of neck     right neck      Osteoarthrosis     right femoral head, resection      Osteopenia      Peripheral neuropathy, idiopathic     ideopathic- chronic pain contract- appts every other month; feet and legs      Peripheral vascular disease (HC)     in bilateral legs      Renal failure      Thyroid nodule     noted on US 2012, repeat 2012 and stable- no f/u recommended      Urinary frequency     urinary incontinence        Past Surgical History:      Procedure  Laterality Date     ANTERIOR AND POSTERIOR VAGINAL REPAIR      x2; done in  [facility]        APPENDECTOMY       BIOPSY  9/14/2012     gastric mild reactive gastropathy, neg for H-pylori       BUNIONECTOMY  10/06    Right foot- Pawel       CHOLECYSTECTOMY  9/2010    Ara       COLONOSCOPY  2/05, 2010     ESOPHAGOGASTRODUODENOSCOPY  9/13/2012    Dr. Main Bullock- for GI bleed       KNEE ARTHROSCOPY Left 2008     PREMALIG/BENIGN SKIN LESION EXCISION  4/2001    excision nevus right neck        right femoral head resection  5/7/2012    from osteoarthrosis       ELISE AND BSO  1980    bleeding- with hx of blood transfusion       TOE AMPUTATION Left 08/20/2015    Left foot lesser toe - [place], 2/24/2016 - Dr Yee       TOTAL HIP ARTHROPLASTY Right 05/07/2012       Family History       Problem   Relation Age of Onset     Heart Disease  Mother      Hypertension  Father      Other  Father      Pulmonary Emboli       No Known Problems  Daughter      Cancer-breast  Sister      Other  Sister      adopted sister       Cancer-breast  Daughter      total mastectomy Age 36       Other  Daughter      OP cerebellar atrophy         Patient Active Problem List     Diagnosis  Code     Idiopathic neuropathy G60.9     PVD (peripheral vascular disease) (HC) I73.9     Thyroid activity decreased E03.9     Chronic ulcer of left foot limited to breakdown of skin (HC) L97.521     Stress incontinence N39.3       Prescriptions Prior to Admission       Medication  Sig Dispense Refill     acetaminophen (TYLENOL) 325 mg tablet Take 650 mg by mouth every 6 hours if needed. Max acetaminophen dose: 4000mg in 24 hrs.       albuterol (PROVENTIL) 0.083 % neb solution Inhale 2.5 mg via a nebulizer 4 times daily. Only uses as necessary  0     aspirin enteric coated 81 mg tablet Take 81 mg by mouth once daily with a meal.       docusate (COLACE) 100 mg tablet Take 100 mg by mouth once daily. Takes 3-4 tablets daily  Indications: constipation       furosemide (LASIX) 20 mg tablet Take 1 tablet by mouth once daily if needed  for Other (Specify).  0     levothyroxine (SYNTHROID) 100 mcg tablet        lisinopril (PRINIVIL; ZESTRIL) 20 mg tablet Take 20 mg by mouth once daily.       methadone (DOLOPHINE) 10 mg tablet 30-40mg twice daily as needed for pain.  0     omeprazole (PRILOSEC) 20 mg capsule Take 20 mg by mouth once every other day.       PARoxetine (PAXIL) 40 mg tablet Take 40 mg by mouth once daily.       polyethylene glycoL (MIRALAX) 17 gram/dose powder Take 17 g by mouth once daily. 595 g 11     tiotropium (SPIRIVA) 18 mcg inhalation capsule Inhale 18 mcg by mouth once daily. As needed.         Allergies:  Allergies      Allergen   Reactions     Latex  Hives and Angioedema     Affected around mouth while at dental office (when latex gloves used)      Nickel  *Unknown     rash      Penicillins  Hives     Sulfa (Sulfonamide Antibiotics)  Other - Describe In Comment Field     Yellow around eyes; ? Liver issues        Review of Systems:  GERD: No  Chest pain: No  Shortness of breath: Yes (Short of breath with activity.)  Recent fever: No  Poor exercise tolerance: Yes (Limited by peripheral vascular disease.  Severe pain in her feet & legs.  Uses cane; has a walker.)  Bleeding tendency: Yes (Stopped ASA for surgery.)  Pregnant: No  Anesthesia Complications: Slow wake (no problems recently).      History    Smoking Status      Never Smoker   Smokeless Tobacco      Never Used     Social History     Social History        Marital status:       Spouse name: N/A     Number of children:  N/A     Years of education:  N/A     Social History Main Topics         Smoking status:   Never Smoker     Smokeless tobacco:   Never Used     Alcohol use   Yes      Comment: once a year      Drug use:   No     Sexual activity:   Not Currently     Other Topics  Concern     Not on file      Social History Narrative     Lives with her , Guero.  Lives in [place].  Three daughters, two live north of [place] and one in the [facility].                Physical Examination:  /86  Pulse 68  Temp 99.1  F (37.3  C)  Resp 18  LMP Comment: ELISE  SpO2 96% There is no height or weight on file to calculate BMI. There is no height or weight on file to calculate BSA.  Dental Condition: Good (Upper full denture in place; lower edentulous.)     Mallampati Score (Airway): II (Short TMD.)  Cardiovascular: Normal  Pulmonary: Abnormal  (Decreased air entry bilaterally; 96% on RA.)  Other: Abnormal  (Bilateral lower extremity pain 6/10; redness & swelling worse on left than right.)    [facility]    No results for input(s): SODIUM, POTASSIUM, CHLORIDE, YK0IFTEU, ANIONGAP, BUN, CREATININE, BUNCREARATIO, CALCIUM, GLUCOSE, GLUCOSEMETER, KETONES, MAGNESIUM, WBC, HGB, HCT, PLT, ABORH, RHTYPE, PREGURINE, BHCGQL, HCGBETAQUANT, INR in the last 72 hours.          Assessment/Plan:  ASA Class: IV  Risk of dental injury discussed: Yes  NPO status confirmed: Yes  Anesthetic Plan:  Regional (Saddle block with sedation.)  Risk/Benefit/Alt discussed: Yes  Questions answered: Yes  Emergency Case?: No  Labs/ECG/Radiology Reviewed?: Yes      H&P Reviewed.  Patient Examined.      Provider Electronic Signature:  TAM WIGGINS MD

## 2017-12-28 NOTE — PATIENT INSTRUCTIONS
Patient Information     Patient Name MRN Gail Gu [id number] Female 1940      Patient Instructions by Sol Rice DO at 10/2/2017 10:20 AM     Author:  Sol Rice DO Service:  (none) Author Type:  PHYS- Osteopathic     Filed:  10/2/2017 11:02 AM Encounter Date:  10/2/2017 Status:  Signed     :  Sol Rice DO (PHYS- Osteopathic)            PRE OP RECOMMENDATIONS:  -- Hold aspirin for 7 days before surgery  -- Hold Advil/ibuprofen, Aleve/naproxen, for 7 days prior to surgery  -- Hold vitamins and supplements for 2 weeks prior to surgery  -- No need to hold regular meds the day of surgery.  Consider decreasing methadone to 30mg twice daily for the week leading up to surgery if able  -- Okay to use Tylenol (Acetaminophen)  -- No food or drink after midnight the night before surgery    Please call our office and the surgical services with any change in condition or new symptoms that develop between today and your surgical date, in particular if you have any new chest pain, shortness of breath, swelling or fevers.

## 2017-12-28 NOTE — TELEPHONE ENCOUNTER
Patient Information     Patient Name MRN Gail Gu [id number] Female 1940      Telephone Encounter by Ирина Martin at 10/3/2017  8:28 AM     Author:  Ирина Martin Service:  (none) Author Type:  (none)     Filed:  10/3/2017  8:30 AM Encounter Date:  10/2/2017 Status:  Signed     :  Ирина Martin            Patient notified. She does wish to be set-up with an echocardiogram. Please place order. She would like this to be done early in the morning if possible as her  works afternoons and she does not drive anymore.  Ирина Martin LPN  10/3/2017  8:29 AM

## 2017-12-28 NOTE — OR POSTOP
Patient Information     Patient Name MRN Sex Gail Hilario [id number] Female 1940      OR PostOp by Mary Robertson RN at 10/26/2017 10:51 AM     Author:  Mary Robertson RN Service:  (none) Author Type:  NURS- Registered Nurse     Filed:  10/26/2017 10:53 AM Date of Service:  10/26/2017 10:51 AM Status:  Signed     :  Mary Robertson RN (NURS- Registered Nurse)            PACU Transfer Note    Gail Perry transferred to DSU via cart.  Equipment used for transport:  None.  Accompanied by:  Registered Nurse. Report given to Ivon Luu RN.    Patient stable and meets phase 1 discharge criteria for transport from PACU.    PACU Respiratory Event Documentation     1) Episodes of Apnea greater than or equal to 10 seconds: No    2) Bradypnea - less than 8 breaths per minute: No    3) Pain score on 0 to 10 scale: 0    4) Pain-sedation mismatch (yes or no): No    5) Repeated 02 desaturation less than 90% (yes or no): Occasional when sleeping.    Anesthesia notified? (yes or no): No    Any of the above events occuring repeatedly in separate 30 minute intervals may be considered recurrent PACU respiratory events.

## 2017-12-28 NOTE — TELEPHONE ENCOUNTER
Patient Information     Patient Name MRN Gail Gu [id number] Female 1940      Telephone Encounter by Beena Valderrama RN at 2017  8:57 AM     Author:  Beena Valderrama RN Service:  (none) Author Type:  NURS- Registered Nurse     Filed:  2017  9:03 AM Encounter Date:  2017 Status:  Signed     :  Beena Valderrama RN (NURS- Registered Nurse)            Patient is currently scheduled for surgery with Dr Boris Guerra MD, FACOG on 10/19/2017. The provider will not longer be able to complete surgery that date as he must go out of town. Patient was called and informed of need to reschedule surgery. Patient was changed to 10/26/2017. No issues with current Pre-op appointment. Patient was transferred to [phone] to reschedule post op appointment. Updated surgical scheduling form faxed to [phone] and [phone].    Beena Valderrama RN............. 2017 9:02 AM

## 2017-12-28 NOTE — OR ANESTHESIA
Patient Information     Patient Name MRN Sex     Gail Perry [id number] Female 1940      OR Anesthesia by Elsi Gonzalez MD at 10/26/2017  2:44 PM     Author:  Elsi Gonzalez MD Service:  (none) Author Type:  PHYS- Anesthesiologist     Filed:  10/26/2017  2:45 PM Date of Service:  10/26/2017  2:44 PM Status:  Signed     :  Elsi Gonzalez MD (PHYS- Anesthesiologist)            Anesthesia Post Operative Care Note    Name: Gail Perry  MRN:   [id number]  :    1940       Procedure Done:  See Surgeon Note   Case Cancelled for Anesthetic Reason:  No   Post Op Considerations:  Opioid Tolerant    Anesthesia Technique    Anesthetic Type:  Regional     Regional Type:  Spinal - see procedure note     Oral Trauma:  No    Intraoperative Course   Hemodynamics:  Stable    Ventilation Normal:  Yes Lung Sounds:  Normal      PACU Course      Nondepolarizer Used: No    Reintubation:  No   Hemodynamics:  Stable      Hydration: Euvolemic   Temperature:  36.1 - 38.3      Mental Status:  Awake, alert, follows commands   Pain Management:  Adequate     Regional Block:  Yes     Regional Block Outcome:  Adequate   Anesthesia Complications:  None      Vital Signs:  Temp: 96.9  F (36.1  C)  Pulse: 62  BP: 141/79  Resp: 16  SpO2: 97 %    O2 Device: Room Air         Level of Nausea: None        Active Lines:  Patient Lines/Drains/Airways Status    Active Line     None                Intake & Output:  Date  10/25/17 07 - 10/26/17 0659(Not Admitted)    10/26/17 07 - 10/27/17 0659(Discharged)      Shift  4805-1393 9432-2312 2615-3592 24 Hour Total 0503-2361 0423-8587 3213-9298 24 Hour Total   I  N  T  A  K  E   Intravenous     1700   1700       +I/O+  Maint IV (lactated Ringers infusion)     1700   1700    Shift Total     1700   1700   O  U  T  P  U  T   Urine     195   195      + I/O +   Straight Cath Urine     75   75      + I/O +   Voided Urine     120   120    Shift Total     195    195   NET      1505   1505   Weight (kg)                  Labs:  No results for input(s): EL9LRUCISHD, RDW3SVBQRAHM, PHARTERIAL, OBI3GWTXGTUG, J3FOLQTMKJGI in the last 24 hours.    No results for input(s): MAGNESIUM in the last 24 hours.    No results for input(s): GLUCOSEMETER in the last 720 hours.        TAM WIGGINS MD ....................  10/26/2017   2:44 PM

## 2017-12-28 NOTE — TELEPHONE ENCOUNTER
Patient Information     Patient Name MRN Gail Gu [id number] Female 1940      Telephone Encounter by Sol Rice DO at 10/3/2017  9:20 AM     Author:  Sol Rice DO Service:  (none) Author Type:  PHYS- Osteopathic     Filed:  10/3/2017  9:21 AM Encounter Date:  10/2/2017 Status:  Signed     :  Sol Rice DO (PHYS- Osteopathic)            Order placed.

## 2017-12-29 NOTE — H&P
Patient Information     Patient Name MRN Gail Gu [id number] Female 1940      H&P by Sol Rice DO at 10/2/2017 10:20 AM     Author:  Sol Rice DO  Service:  (none) Author Type:  PHYS- Osteopathic     Filed:  10/24/2017 11:51 AM  Encounter Date:  10/2/2017 Status:  Addendum     :  Sol Rice DO (PHYS- Osteopathic)        Related Notes: Original Note by Sol Rice DO (PHYS- Osteopathic) filed at 10/6/2017  7:37 AM            ----------------- PREOPERATIVE EXAM ------------------  10/2/2017    HPI:  Gail Perry is a 77 y.o. female here for preoperative optimization requested by Dr. Guerra due to medical comorbidity including history of anemia, peripheral vascular disease, chronic pain.  She overall is feeling good and denies any concerns.  She has not had any new chest pain, SOB, palpitations, dyspnea on exertion, cough/cold symptoms, orthopnea or LE edema.  She is not limited in exercise due to chest pain, SOB or other cardiopulmonary symptoms although is limited in the amount of activity she participates in.    She does have a history of peripheral vascular disease.  She is currently on methadone for her pain.  She does report that the decrease in sensation is on her feet and up to her mid calf.  She denies any sores or concerns in this area although does have a history of osteomyelitis with ulceration and MRSA.  He does feel that the methadone controls her pain at this time and she does take 30-40 mg twice daily as needed.  She does have a prior EKG that shows inferior infarct that was age indeterminate.  This was back in .    She has a history of anemia.  Outside records are reviewed.  Her hemoglobin was last checked a year ago.  At that time it was 10.5.  This has been stable for her since approximately .  She was on iron replacement for a period of time however no longer is taking this.    She does report today that she has been having some  episodes of apnea.  She does report having done a polysomnogram and was told she had borderline sleep apnea however she was never prescribed a CPAP.    Date of Surgery: October 26, 2017  Type of Surgery: Placement of a bladder neck sling  Surgeon: Dr. Guerra  [facility]:  [facility]    Fever/Chills or other infectious symptoms in past month? no  >10lbs weight loss in the past 2 months? no  No prolonged steroid use in past year.  Recent use of: aspirin  Health Care Directive on file? no  History of blood transfusions? Yes - tolerated in past  Td up to date? 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  Yes - she has had some difficulty awakening from anesthetic however has not had any issues since the early 1980s  History of blood clots, bleeding issues or anesthetic problems in immediate family members?  no    Obstructive Sleep Apnea screening    History of diagnosis of MARYAM?  Reportedly borderline  Do you snore loudly? yes  Has anyone ever observed you stop breathing during your sleep? yes   Do you have or are you being treated for high blood pressure? yes    Is BMI >35? no  Is Pt > 50 years old? yes  Neck circumference > > 15.75 inches/40cm? yes  Male? no     Total yes answers in MARYAM section: 5    Low Risk 0-2  At Risk 3-4  High Risk 5-8        ROS  GEN: -fevers/-chills/-night sweats/-wt change  NEURO: -headaches/-vision changes  EARS: -hearing/-tinnitus  NOSE: -drainage/-congestion  MOUTH/THROAT: - sore throat/-dysphagia/-sores  LUNGS: -sob/-cough  CARDIOVASCULAR: -cp/-palpitations  GI: -pain/-diarrhea/-constipation/-bloody stools  : -dysuria/-hematuria  HEMATOLOGIC/LYMPHATIC: -swollen nodes/-easy bruising  SKIN: -rashes/-lesions  MSK/RHEUM: +joint pain/-swelling/-falls  NEURO: -weakness/+parasthesias  PSYCH: -anhedonia/+depression/-anxiety        History is discussed on 10/2/2017 with patient and reviewed in  available records by myself.  It is current to the best of my knowledge as below.    Past Medical History:     Diagnosis  Date     Anemia      Anesthesia     hx difficulty awakening from surgery many years ago (1980)- no problem with recent surgeries      Bilateral bunions     surgery for right      COPD (chronic obstructive pulmonary disease) (HC)     mild      Depression      Epiretinal membrane, right eye      GERD (gastroesophageal reflux disease)     gastritis      History of blood transfusion 1980    with hysterectomy      HSV (herpes simplex virus) infection     cold sores - none for years      HTN (hypertension)      Hyperlipidemia      Hypothyroidism      Intermittent claudication (HC)      Lumbar back pain      Macular degeneration     OD      MRSA (methicillin resistant Staphylococcus aureus) 8/2011    toe L foot ulcerations      Nevus of neck     right neck      Osteoarthrosis 2012    right femoral head, resection      Osteopenia      Peripheral neuropathy, idiopathic     ideopathic- chronic pain contract- appts every other month; feet and legs      Peripheral vascular disease (HC)     in bilateral legs      Renal failure      Thyroid nodule     noted on US 2/2012, repeat 11/2012 and stable- no f/u recommended      Urinary frequency 2006    urinary incontinence        Past Surgical History:      Procedure  Laterality Date     ANTERIOR AND POSTERIOR VAGINAL REPAIR      x2; done in [place] [facility]        APPENDECTOMY       BIOPSY  9/14/2012     gastric mild reactive gastropathy, neg for H-pylori       BUNIONECTOMY  10/06    Right foot- Pawel       CHOLECYSTECTOMY  9/2010    Ara       COLONOSCOPY  2/05, 2010     ESOPHAGOGASTRODUODENOSCOPY  9/13/2012    Dr. Main Bullock- for GI bleed       KNEE ARTHROSCOPY Left 2008     PREMALIG/BENIGN SKIN LESION EXCISION  4/2001    excision nevus right neck        right femoral head resection  5/7/2012    from osteoarthrosis       ELISE AND BSO  1980    bleeding- with hx  of blood transfusion       TOE AMPUTATION Left 08/20/2015    Left foot lesser toe - Gwendolyn, 2/24/2016 - Dr Yee       TOTAL HIP ARTHROPLASTY Right 05/07/2012       Current Outpatient Prescriptions     Medication  Sig     acetaminophen (TYLENOL) 325 mg tablet Take 650 mg by mouth every 6 hours if needed. Max acetaminophen dose: 4000mg in 24 hrs.     albuterol (PROVENTIL) 0.083 % neb solution Inhale 3 mL via a nebulizer 4 times daily.     aspirin enteric coated 81 mg tablet Take 81 mg by mouth once daily with a meal.     Calcium-Cholecalciferol, D3, 600 mg calcium- 200 unit cap Take 1 Tab by mouth 2 times daily.     docusate (COLACE) 100 mg tablet Take 100 mg by mouth once daily. Indications: CONSTIPATION     furosemide (LASIX) 20 mg tablet Take 1 tablet by mouth once daily if needed for Other (Specify).     levothyroxine (SYNTHROID) 75 mcg tablet Take 75 mcg by mouth once daily.     lisinopril (PRINIVIL; ZESTRIL) 20 mg tablet Take 20 mg by mouth once daily.     methadone (DOLOPHINE) 10 mg tablet 30-40mg twice daily as needed for pain.     omeprazole (PRILOSEC) 20 mg capsule Take 20 mg by mouth once daily before a meal.     PARoxetine (PAXIL) 40 mg tablet Take 40 mg by mouth once daily.     polyethylene glycoL (MIRALAX) 17 gram/dose powder Take 17 g by mouth once daily.     tiotropium (SPIRIVA) 18 mcg inhalation capsule Inhale 18 mcg by mouth once daily. As needed.     No current facility-administered medications for this visit.      Medications have been reviewed by me and are current to the best of my knowledge and ability.       Allergies      Allergen   Reactions     Latex  Hives and Angioedema     Affected around mouth while at dental office (when latex gloves used)      Nickel  *Unknown     rash      Penicillins  Hives     Sulfa (Sulfonamide Antibiotics)  Other - Describe In Comment Field     Yellow around eyes; ? Liver issues        Social History     Social History        Marital status:       Spouse  "name: N/A     Number of children:  N/A     Years of education:  N/A     Social History Main Topics         Smoking status:   Never Smoker     Smokeless tobacco:   Never Used     Alcohol use   Yes      Comment: once a year      Drug use:   No     Sexual activity:   Not Currently     Other Topics  Concern     Not on file      Social History Narrative     Lives with her , Guero.  Lives in [place].  Three daughters, two live north of [place] and one in the [facility].               Family Status     Relation  Status     Mother      Father      Daughter Alive     Sister      Daughter Alive     Daughter Alive       Family History       Problem   Relation Age of Onset     Heart Disease  Mother      Hypertension  Father      Other  Father      Pulmonary Emboli       No Known Problems  Daughter      Cancer-breast  Sister      Other  Sister      adopted sister       Cancer-breast  Daughter      total mastectomy Age 36       Other  Daughter      OP cerebellar atrophy         -------------------------------------------------------------    PHYSICAL EXAM:  /90  Pulse 64  Ht 1.562 m (5' 1.5\")  Wt 83 kg (182 lb 14.4 oz)  SpO2 91%  BMI 34 kg/m2  Estimated body mass index is 34 kg/(m^2) as calculated from the following:    Height as of this encounter: 1.562 m (5' 1.5\").    Weight as of this encounter: 83 kg (182 lb 14.4 oz).      GEN: Vitals reviewed.  Patient is in no acute distress.  Cooperative with exam.  HEENT: Normocephalic atraumatic. Normal external eye, conjunctiva, lids, cornea. Pupils equally round. No scleral icterus or conjunctival erythema. Oropharynx with no erythema or exudates or sores.   NECK: Supple; no thyromegaly or masses noted.  No cervical or supraclavicular lymphadenopathy.  CV: Heart regular in rate and rhythm with no murmur. Radial and posterior tibial pulses palpable.  LUNGS: Lungs clear to auscultation bilaterally.  Chest rise equal bilaterally. No accessory " muscle use.  ABD: No CVA tenderness. Soft, nontender, and nondistended. No rebound. Bowel sounds positive.  SKIN: Warm and dry to touch.  No rash on face, arms and legs.  EXT: No clubbing or cyanosis.  Trace to 1 plus bilateral lower extremity peripheral edema.  Worse on the right than the left.    NEURO: Alert and oriented to person, place, and time. Cranial nerves II-XII grossly intact with no focal or lateralizing deficits.  Muscle tone normal.  Gait normal. No tremor.  Sensation intact to light touch.  MSK: Back is straight, full ROM of upper and lower extremities.  PSYCH: Affect appropriate.  Appropriate and linear thought processes.      CXR:  Not indicated    EKG: 10/2/2017 -Personally ordered/reviewed - Normal sinus rhythm with a rate of 64.  Q waves noted in leads 3 and aVF.  ST segment abnormality with T-wave inversion in V2 and V3 and flattening through V5.  QRS, QTC and SD intervals are all normal.  Possible inferior infarct, age indeterminate.  This is unchanged from 2015.    LABS: Personally ordered and reviewed as below.  ---------------------------------------------------------------  Results for orders placed or performed in visit on 10/02/17      CBC W PLT NO DIFF      Result  Value Ref Range    WHITE BLOOD COUNT         4.8 4.5 - 11.0 thou/cu mm    RED BLOOD COUNT           3.62 (L) 4.00 - 5.20 mil/cu mm    HEMOGLOBIN                10.5 (L) 12.0 - 16.0 g/dL    HEMATOCRIT                33.0 33.0 - 51.0 %    MCV                       91 80 - 100 fL    MCH                       29.0 26.0 - 34.0 pg    MCHC                      31.8 (L) 32.0 - 36.0 g/dL    RDW                       13.0 11.5 - 15.5 %    PLATELET COUNT            193 140 - 440 thou/cu mm    MPV                       9.3 6.5 - 11.0 fL   BASIC METABOLIC PANEL      Result  Value Ref Range    SODIUM 142 133 - 143 mmol/L    POTASSIUM 4.2 3.5 - 5.1 mmol/L    CHLORIDE 107 98 - 107 mmol/L    CO2,TOTAL 26 21 - 31 mmol/L    ANION GAP 9 5 - 18                     GLUCOSE 93 70 - 105 mg/dL    CALCIUM 9.3 8.6 - 10.3 mg/dL    BUN 26 (H) 7 - 25 mg/dL    CREATININE 1.45 (H) 0.70 - 1.30 mg/dL    BUN/CREAT RATIO           18                    GFR if African American 43 (L) >60 ml/min/1.73m2    GFR if not African American 35 (L) >60 ml/min/1.73m2       ASSESSEMENT AND PLAN:    Preoperative examination  - Patient is able to tolerate greater than 4 METs of activity without any cardiopulmonary symptoms.   - The patient has RCRI risk factors of 1  - ASA PS class 2  - at this time given that she has had abnormalities on her EKG since 2015 with no further follow-up I do think it is reasonable to obtain an echocardiogram.  If this is normal with no significant wall motion abnormalities I would not recommend any further testing at this time.  If significant abnormality is noted it would be recommended to be seen through cardiology prior to surgery.    PRE OP RECOMMENDATIONS:  - Patient is on chronic pain medications (YES) and plan is decrease as able prior to surgery.  Resume treatment with her primary care provider postop.  - Patient is on antiplatlet/anticoagulation (YES) and plan is hold prior  - Other medications that need adjustment perioperatively (NO) - although it was recommended to try to reduce her methadone dose leading up to surgery.   - Other: Patient was advised to call our office and the surgical services with any change in condition, concerns or new symptoms that develop between today and their surgical date. Especially any cardiopulmonary symptoms or symptoms concerning for an infection.      Diagnoses and all orders for this visit:    Preoperative examination  -     CT ELECTROCARDIOGRAM TRACING  -     EKG 12 LEAD UNIT PERFORMED  -     ECHO COMPLETE W CONTRAST; Future    Anemia, unspecified type  -     stable from prior at this time.  -     CBC W PLT NO DIFF    Apnea  - given her episodes of apnea she very well may need post op noninvasive ventilation.  I  question if this is secondary to her methadone and medication use.  I would recommend reducing these as able long-term and considering a polysomnogram at some point in the future through her primary care provider.    PVD (peripheral vascular disease) (HC)  -     [place] ELECTROCARDIOGRAM TRACING  -     EKG 12 LEAD UNIT PERFORMED  -     ECHO COMPLETE W CONTRAST; Future    Abnormal EKG  - echo ordered due to persistent abnormal EKG with no interval cardiac workup as above.      - ECHO COMPLETE W CONTRAST; Future    Elevated serum creatinine        - she is encouraged to increase her water intake overall within reason.  She needs to avoid any nonsteroidal anti-inflammatory medications.  I would recommend postop assessment of renal function.    High risk medication use  -     BASIC METABOLIC PANEL    Need for influenza vaccination  -     FLU VACCINE => 65 YRS HIGH DOSE TRIVALENT IIV3 IM  -     [place] ADMIN VACC INITIAL        Patient Instructions   PRE OP RECOMMENDATIONS:  -- Hold aspirin for 7 days before surgery  -- Hold Advil/ibuprofen, Aleve/naproxen, for 7 days prior to surgery  -- Hold vitamins and supplements for 2 weeks prior to surgery  -- No need to hold regular meds the day of surgery.  Consider decreasing methadone to 30mg twice daily for the week leading up to surgery if able  -- Okay to use Tylenol (Acetaminophen)  -- No food or drink after midnight the night before surgery    Please call our office and the surgical services with any change in condition or new symptoms that develop between today and your surgical date, in particular if you have any new chest pain, shortness of breath, swelling or fevers.           KLEVER CAZARES DO  10/2/2017 11:14 AM      ADDENDUM:  10/24/2017   11:50 AM    Echocardiogram is reviewed.  No wall motion abnormalities or other concerns noted.  Patient is scheduled for surgery later this week and there is no further intervention needed prior.    KLEVER CAZARES DO

## 2017-12-30 NOTE — ED TRIAGE NOTES
Patient Information     Patient Name MRN Sex Gail Hilario [id number] Female 1940      ED Triage Notes by Luisa Mccoy RN at 10/26/2017  1:00 PM     Author:  Luisa Mccoy RN Service:  (none) Author Type:  NURS- Registered Nurse     Filed:  10/26/2017  2:29 PM Date of Service:  10/26/2017  1:00 PM Status:  Signed     :  Luisa Mccoy RN (NURS- Registered Nurse)            Discharge Note    Data:  Gail Perry has been discharged home at 1300 via wheelchair accompanied by Registered Nurse.      Action:  Written discharge/follow-up instructions were provided to patient and Spouse. Prescriptions were filled and sent with patient.  Belongings sent with patient and Spouse. Medications from home sent with patient/family: Not Applicable  Equipment none .     Response:  Patient and Spouse verbalized understanding of discharge instructions, reason for discharge, and necessary follow-up appointments. No further questions or concerns    LUISA MCCOY RN ....................  10/26/2017   2:29 PM

## 2017-12-30 NOTE — NURSING NOTE
Patient Information     Patient Name MRN Gail Gu [id number] Female 1940      Nursing Note by Fariha Delacruz at 10/2/2017 10:20 AM     Author:  Fariha Delacruz Service:  (none) Author Type:  (none)     Filed:  10/2/2017 10:34 AM Encounter Date:  10/2/2017 Status:  Signed     :  Fariha Delacruz            Patient here for preop for placement of bladder neck sling. Tdap 2014, needs updated EKG today, and flu vaccine as well. Fariha Delacruz LPN .......................10/2/2017  10:29 AM

## 2017-12-30 NOTE — NURSING NOTE
Patient Information     Patient Name MRN Sex Gail Hilario [id number] Female 1940      Nursing Note by Jared De LPN at 2017 10:15 AM     Author:  Jared De LPN  Service:  (none) Author Type:  NURS- Licensed Practical Nurse     Filed:  2017 10:22 AM  Encounter Date:  2017 Status:  Addendum     :  Jared De LPN (NURS- Licensed Practical Nurse)        Related Notes: Original Note by Jared De LPN (NURS- Licensed Practical Nurse) filed at 2017 10:19 AM            Patient presents to the clinic to discuss surgery. Patient states she is ready to feel better.  Jared De LPN ..............2017 10:19 AM

## 2018-01-02 NOTE — PROGRESS NOTES
Patient Information     Patient Name MRN Gail Gu [id number] Female 1940      Progress Notes by Boris Guerra MD at 2017  9:45 AM     Author:  Boris Guerra MD Service:  (none) Author Type:  Physician     Filed:  2017 10:45 AM Encounter Date:  2017 Status:  Signed     :  Boris Guerra MD (Physician)            SUBJECTIVE:    Gail Perry is a 76 y.o. female who presents for consultation & treatment of her incontinence symptoms at the request of Dr Joy.    HPI Comments: Gail has a history of 2 A&P repairs done in the [facility].  She now has worsening incontinence requiring daily Depends pads due to the constant leakage.    Gyn Exam   The patient's pertinent negatives include no genital itching, genital lesions, genital odor, genital rash, missed menses, pelvic pain, vaginal bleeding or vaginal discharge. This is a chronic problem. The current episode started more than 1 year ago. The problem occurs daily. The problem has been gradually worsening. The patient is experiencing no pain. Associated symptoms include urgency. Pertinent negatives include no chills, constipation, dysuria, fever, flank pain, frequency, hematuria, nausea, rash or vomiting. The symptoms are aggravated by activity, heavy lifting and bowel movements. Treatments tried: Botox injections. The treatment provided no relief. She is not sexually active. She uses hysterectomy for contraception. She is postmenopausal. Her past medical history is significant for a gynecological surgery. There is no history of an abdominal surgery, a  section, an ectopic pregnancy, endometriosis, herpes simplex, menorrhagia, metrorrhagia, miscarriage, ovarian cysts, PID, an STD or vaginosis.       Allergies      Allergen   Reactions     Latex  Hives and Angioedema     Affected around mouth while at dental office (when latex gloves used)      Nickel  *Unknown     rash      Penicillins  Hives      Sulfa (Sulfonamide Antibiotics)  Other - Describe In Comment Field     Yellow around eyes    ,   Current Outpatient Prescriptions:      acetaminophen (TYLENOL) 325 mg tablet, Take 650 mg by mouth every 6 hours if needed. Max acetaminophen dose: 4000mg in 24 hrs., Disp: , Rfl:      albuterol (PROVENTIL) 0.083 % neb solution, Inhale 3 mL via a nebulizer 4 times daily., Disp: , Rfl: 0     aspirin enteric coated 81 mg tablet, Take 81 mg by mouth once daily with a meal., Disp: , Rfl:      Calcium-Cholecalciferol, D3, 600 mg calcium- 200 unit cap, Take 1 Tab by mouth 2 times daily., Disp: , Rfl: 0     docusate (COLACE) 100 mg tablet, Take 100 mg by mouth once daily. Indications: CONSTIPATION, Disp: , Rfl:      ferrous sulfate 325 mg delayed release tablet, Take 1 tablet by mouth 2 times daily with meals., Disp: , Rfl: 0     furosemide (LASIX) 20 mg tablet, Take 20 mg by mouth., Disp: , Rfl:      levothyroxine (SYNTHROID) 75 mcg tablet, Take 75 mcg by mouth once daily., Disp: , Rfl:      lisinopril (PRINIVIL; ZESTRIL) 20 mg tablet, Take 20 mg by mouth once daily., Disp: , Rfl:      methadone (DOLOPHINE) 10 mg tablet, Take 40 mg by mouth 2 times daily. As directed., Disp: , Rfl:      omeprazole (PRILOSEC) 20 mg capsule, Take 20 mg by mouth once daily before a meal., Disp: , Rfl:      oxyCODONE (ROXICODONE) 5 mg immediate release tablet, Take 5-10 mg by mouth, Disp: , Rfl:      PARoxetine (PAXIL) 40 mg tablet, Take 40 mg by mouth once daily., Disp: , Rfl:      polyethylene glycoL (MIRALAX) 17 gram/dose powder, Take 17 g by mouth once daily., Disp: 595 g, Rfl: 11     tiotropium (SPIRIVA) 18 mcg inhalation capsule, Inhale 18 mcg by mouth once daily. As needed., Disp: , Rfl:   Medications have been reviewed by me and are current to the best of my knowledge and ability.,   Past Medical History      Diagnosis   Date     Anemia       Anesthesia       hx difficulty awakening from surgery many years ago (1980)- no problem with  recent surgeries      Bilateral bunions       surgery for right      Chronic pain       COPD (chronic obstructive pulmonary disease) (HC)       mild      Depression       Epiretinal membrane, right eye       GERD (gastroesophageal reflux disease)       gastritis      History of blood transfusion       with hysterectomy      History of blood transfusion  1980     with hysterectomy      HSV (herpes simplex virus) infection       HTN (hypertension)       Hyperlipidemia       Hypothyroidism       Intermittent claudication (HC)       Lumbar back pain       Macular degeneration       OD      MRSA (methicillin resistant Staphylococcus aureus)  8/2011     toe L foot ulcerations      Nevus of neck       right neck      Osteoarthrosis  2012     right femoral head, resection      Osteopenia       Peripheral neuropathy (HC)       Feet      Peripheral neuropathy, idiopathic       ideopathic- chronic pain contract- appts every other month      Peripheral vascular disease (HC)       Renal failure       Renal insufficiency       Progressed to renal failure      Thyroid nodule       noted on US 2/2012, repeat 11/2012 and stable- no f/u recommended      Urinary frequency  2006     urinary incontinence      UTI (urinary tract infection)     ,   Patient Active Problem List      Diagnosis Date Noted     Stress incontinence 12/27/2016     Chronic ulcer of left foot limited to breakdown of skin (HC) 08/19/2015     Idiopathic neuropathy 06/22/2015     PVD (peripheral vascular disease) (HC) 06/22/2015     Thyroid activity decreased 06/22/2015   ,   Past Surgical History       Procedure   Laterality Date     Bunionectomy   10/06     Right foot- Pawel       Right femoral head resection   5/7/2012     from osteoarthrosis       Biopsy   9/14/2012      gastric mild reactive gastropathy, neg for H-pylori       Appendectomy        Ministerio and bso   1980     bleeding- with hx of blood transfusion       Genital procedure female        anterior  "posterior vaginal surgery X2       Colonoscopy   2/05, 2010     Premalig/benign skin lesion excision   4/2001     excision nevus right neck        Knee arthroscopy   2008     Cholecystectomy   9/2010     Ara       Esophagogastroduodenoscopy   9/13/2012     Dr. 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   Cardiovascular: Negative for chest pain, palpitations and orthopnea.   Gastrointestinal: Negative for constipation, nausea and vomiting.   Genitourinary: Positive for urgency. Negative for dysuria, flank pain, frequency, hematuria, menorrhagia, missed menses, pelvic pain and vaginal discharge.   Skin: Negative for itching and rash.       OBJECTIVE:  /88  Pulse 66  Ht 1.588 m (5' 2.5\")  Wt 86.5 kg (190 lb 9.6 oz)  Breastfeeding? No  BMI 34.31 kg/m2    EXAM:   Physical Exam   Constitutional: She is oriented to person, place, and time and well-developed, well-nourished, and in no distress.   HENT:   Head: Normocephalic and atraumatic.   Eyes: Pupils are equal, round, and reactive to light.   Genitourinary:   Genitourinary Comments: Declines pelvic exam   Neurological: She is alert and oriented to person, place, and time.   Skin: Skin is warm and dry.   Psychiatric: Mood, memory, affect and judgment normal.       ASSESSMENT/PLAN:  1. Stress incontinence with urge component.  Urodynamics testing confirms the diagnosis     Plan:  We discussed options for stress incontinence treatment including expectant mgmt versus pessary versus collagen injections versus bladder " neck sling.  We discussed the pros & cons of each method and all her questions were answered.  I provided a brochure on stress incontinence and my business card.  She'll discuss this with her  and let us know if she would like to proceed with surgery.

## 2018-01-02 NOTE — NURSING NOTE
Patient Information     Patient Name MRN Gail Gu [id number] Female 1940      Nursing Note by Berna Kaur at 2017  9:45 AM     Author:  Berna Kaur Service:  (none) Author Type:  (none)     Filed:  2017 10:15 AM Encounter Date:  2017 Status:  Signed     :  Berna Kaur            Patient presents in consultation for sling.  Berna Kaur LPN ....................  2017   9:57 AM

## 2018-01-16 RX ORDER — ASPIRIN 81 MG/1
81 TABLET ORAL
COMMUNITY
End: 2019-07-01

## 2018-01-16 RX ORDER — ASPIRIN 81 MG
100 TABLET, DELAYED RELEASE (ENTERIC COATED) ORAL
COMMUNITY
End: 2019-07-01

## 2018-01-16 RX ORDER — PAROXETINE 40 MG/1
40 TABLET, FILM COATED ORAL EVERY MORNING
COMMUNITY

## 2018-01-16 RX ORDER — METHADONE HYDROCHLORIDE 10 MG/1
TABLET ORAL
COMMUNITY
Start: 2017-10-02 | End: 2018-07-02

## 2018-01-16 RX ORDER — ALBUTEROL SULFATE 0.83 MG/ML
2.5 SOLUTION RESPIRATORY (INHALATION)
COMMUNITY
Start: 2016-06-14 | End: 2019-07-01

## 2018-01-16 RX ORDER — TIOTROPIUM BROMIDE 18 UG/1
18 CAPSULE ORAL; RESPIRATORY (INHALATION)
COMMUNITY
End: 2019-07-01

## 2018-01-16 RX ORDER — LEVOTHYROXINE SODIUM 100 UG/1
100 TABLET ORAL DAILY
COMMUNITY
Start: 2017-07-30

## 2018-01-16 RX ORDER — FUROSEMIDE 20 MG
20 TABLET ORAL
COMMUNITY
Start: 2017-10-02 | End: 2019-07-01

## 2018-01-16 RX ORDER — LISINOPRIL 20 MG/1
20 TABLET ORAL
COMMUNITY
End: 2019-07-01

## 2018-01-16 RX ORDER — POLYETHYLENE GLYCOL 3350 17 G/17G
17 POWDER, FOR SOLUTION ORAL
COMMUNITY
Start: 2016-06-27 | End: 2019-07-01

## 2018-01-16 RX ORDER — ACETAMINOPHEN 325 MG/1
650 TABLET ORAL EVERY 4 HOURS PRN
COMMUNITY

## 2018-01-28 VITALS
BODY MASS INDEX: 33.66 KG/M2 | BODY MASS INDEX: 33.77 KG/M2 | WEIGHT: 182.9 LBS | HEART RATE: 66 BPM | HEIGHT: 62 IN | HEART RATE: 64 BPM | OXYGEN SATURATION: 91 % | HEIGHT: 63 IN | SYSTOLIC BLOOD PRESSURE: 138 MMHG | SYSTOLIC BLOOD PRESSURE: 138 MMHG | WEIGHT: 190.6 LBS | DIASTOLIC BLOOD PRESSURE: 90 MMHG | DIASTOLIC BLOOD PRESSURE: 88 MMHG

## 2018-01-28 VITALS
HEART RATE: 72 BPM | BODY MASS INDEX: 32.21 KG/M2 | SYSTOLIC BLOOD PRESSURE: 140 MMHG | DIASTOLIC BLOOD PRESSURE: 90 MMHG | WEIGHT: 181.8 LBS | HEIGHT: 63 IN

## 2018-02-04 ASSESSMENT — ANXIETY QUESTIONNAIRES: GAD7 TOTAL SCORE: 0

## 2018-02-04 ASSESSMENT — PATIENT HEALTH QUESTIONNAIRE - PHQ9
SUM OF ALL RESPONSES TO PHQ QUESTIONS 1-9: 0
SUM OF ALL RESPONSES TO PHQ QUESTIONS 1-9: 3

## 2018-06-14 DIAGNOSIS — R07.9 CHEST PAIN, UNSPECIFIED TYPE: Primary | ICD-10-CM

## 2018-06-29 ENCOUNTER — TELEPHONE (OUTPATIENT)
Dept: CARDIAC REHAB | Facility: OTHER | Age: 78
End: 2018-06-29

## 2018-06-29 NOTE — TELEPHONE ENCOUNTER
Called to remind patient of stress test and review instructions. Message left with appointment time and our direct phone number for any questions.

## 2018-07-02 ENCOUNTER — HOSPITAL ENCOUNTER (OUTPATIENT)
Dept: NUCLEAR MEDICINE | Facility: OTHER | Age: 78
End: 2018-07-02
Payer: MEDICARE

## 2018-07-02 ENCOUNTER — HOSPITAL ENCOUNTER (OUTPATIENT)
Dept: NUCLEAR MEDICINE | Facility: OTHER | Age: 78
Discharge: HOME | End: 2018-07-02
Admitting: INTERNAL MEDICINE
Payer: MEDICARE

## 2018-07-02 DIAGNOSIS — R07.9 CHEST PAIN, UNSPECIFIED TYPE: ICD-10-CM

## 2018-07-02 PROCEDURE — 34300033 ZZH RX 343

## 2018-07-02 PROCEDURE — 25000128 H RX IP 250 OP 636: Performed by: INTERNAL MEDICINE

## 2018-07-02 PROCEDURE — 93016 CV STRESS TEST SUPVJ ONLY: CPT | Performed by: INTERNAL MEDICINE

## 2018-07-02 PROCEDURE — 93017 CV STRESS TEST TRACING ONLY: CPT

## 2018-07-02 PROCEDURE — 78452 HT MUSCLE IMAGE SPECT MULT: CPT

## 2018-07-02 PROCEDURE — A9500 TC99M SESTAMIBI: HCPCS

## 2018-07-02 PROCEDURE — 93018 CV STRESS TEST I&R ONLY: CPT | Performed by: INTERNAL MEDICINE

## 2018-07-02 RX ORDER — REGADENOSON 0.08 MG/ML
0.4 INJECTION, SOLUTION INTRAVENOUS ONCE
Status: COMPLETED | OUTPATIENT
Start: 2018-07-02 | End: 2018-07-02

## 2018-07-02 RX ORDER — HYDROCODONE BITARTRATE AND ACETAMINOPHEN 7.5; 325 MG/1; MG/1
1 TABLET ORAL EVERY 6 HOURS PRN
COMMUNITY

## 2018-07-02 RX ORDER — AMINOPHYLLINE 25 MG/ML
50 INJECTION, SOLUTION INTRAVENOUS
Status: DISCONTINUED | OUTPATIENT
Start: 2018-07-02 | End: 2018-07-03 | Stop reason: HOSPADM

## 2018-07-02 RX ADMIN — KIT FOR THE PREPARATION OF TECHNETIUM TC99M SESTAMIBI 7.95 MCI.: 1 INJECTION, POWDER, LYOPHILIZED, FOR SOLUTION PARENTERAL at 11:10

## 2018-07-02 RX ADMIN — KIT FOR THE PREPARATION OF TECHNETIUM TC99M SESTAMIBI 29.1 MCI.: 1 INJECTION, POWDER, LYOPHILIZED, FOR SOLUTION PARENTERAL at 13:05

## 2018-07-02 RX ADMIN — REGADENOSON 0.4 MG: 0.08 INJECTION, SOLUTION INTRAVENOUS at 13:07

## 2018-07-02 NOTE — PROGRESS NOTES
11:00The patient arrived for a Lexiscan Cardiolite stress test.  The procedure, risks, and benefits were discussed with the patient and spouse,and the consent was signed.  A saline lock was started,and the Cardiolite was injected by x-ray.  The patient was taken to the waiting area, to await resting images at 11:35.  1240:  The patient returned from x-ray and was prepped for the stress test.    arrived, and the patient was administered the Lexiscan per procedure.  The patient tolerated the procedure.  She was given a snack and taken to x-ray in stable condition for stress images.  The saline lock will be removed by x-ray for proper disposal.  Please see the chart for the complete test results.

## 2019-06-19 ENCOUNTER — TRANSFERRED RECORDS (OUTPATIENT)
Dept: HEALTH INFORMATION MANAGEMENT | Facility: CLINIC | Age: 79
End: 2019-06-19

## 2019-06-19 LAB
CREAT SERPL-MCNC: 1.7 MG/DL (ref 0.7–1.4)
GFR SERPL CREATININE-BSD FRML MDRD: 31 ML/MIN/1.73M2
GLUCOSE SERPL-MCNC: 114 MG/DL (ref 64–112)
POTASSIUM SERPL-SCNC: 5.3 MEQ/L (ref 3.5–5.3)

## 2019-07-01 ENCOUNTER — OFFICE VISIT (OUTPATIENT)
Dept: OTOLARYNGOLOGY | Facility: OTHER | Age: 79
End: 2019-07-01
Attending: OTOLARYNGOLOGY
Payer: MEDICARE

## 2019-07-01 VITALS
HEART RATE: 76 BPM | DIASTOLIC BLOOD PRESSURE: 86 MMHG | SYSTOLIC BLOOD PRESSURE: 148 MMHG | HEIGHT: 61 IN | BODY MASS INDEX: 35.87 KG/M2 | TEMPERATURE: 97.2 F | WEIGHT: 190 LBS

## 2019-07-01 DIAGNOSIS — Z99.81 DEPENDENCE ON NOCTURNAL OXYGEN THERAPY: ICD-10-CM

## 2019-07-01 DIAGNOSIS — R13.10 DYSPHAGIA, UNSPECIFIED TYPE: Primary | ICD-10-CM

## 2019-07-01 DIAGNOSIS — R49.0 DYSPHONIA: ICD-10-CM

## 2019-07-01 DIAGNOSIS — J38.1 REINKE'S EDEMA OF VOCAL FOLDS: ICD-10-CM

## 2019-07-01 DIAGNOSIS — R59.0 ENLARGED LYMPH NODE IN NECK: ICD-10-CM

## 2019-07-01 PROCEDURE — G0463 HOSPITAL OUTPT CLINIC VISIT: HCPCS

## 2019-07-01 PROCEDURE — 99203 OFFICE O/P NEW LOW 30 MIN: CPT | Mod: 25 | Performed by: OTOLARYNGOLOGY

## 2019-07-01 PROCEDURE — 31575 DIAGNOSTIC LARYNGOSCOPY: CPT | Performed by: OTOLARYNGOLOGY

## 2019-07-01 RX ORDER — GABAPENTIN 100 MG/1
CAPSULE ORAL DAILY
COMMUNITY
End: 2021-04-01

## 2019-07-01 RX ORDER — PANTOPRAZOLE SODIUM 20 MG/1
40 TABLET, DELAYED RELEASE ORAL DAILY
COMMUNITY

## 2019-07-01 RX ORDER — ATORVASTATIN CALCIUM 10 MG/1
TABLET, FILM COATED ORAL DAILY
COMMUNITY
End: 2021-02-04

## 2019-07-01 RX ORDER — AMITRIPTYLINE HYDROCHLORIDE 50 MG/1
TABLET ORAL AT BEDTIME
COMMUNITY

## 2019-07-01 RX ORDER — OXYBUTYNIN CHLORIDE 5 MG/1
TABLET ORAL 2 TIMES DAILY
COMMUNITY

## 2019-07-01 RX ORDER — PREGABALIN 100 MG/1
CAPSULE ORAL 2 TIMES DAILY
COMMUNITY
End: 2021-04-01

## 2019-07-01 ASSESSMENT — MIFFLIN-ST. JEOR: SCORE: 1284.21

## 2019-07-01 ASSESSMENT — PAIN SCALES - GENERAL: PAINLEVEL: EXTREME PAIN (9)

## 2019-07-01 NOTE — PATIENT INSTRUCTIONS
Thank you for allowing Dr. Jacobson and our ENT team to participate in your care.  If your medications are too expensive, please give the nurse a call.  We can possibly change this medication.  If you have a scheduling or an appointment question please contact our Health Unit Coordinator at their direct line [phone].   ALL nursing questions or concerns can be directed to your ENT nurse at: [phone] - Meme    Start Voice Therapy  Complete Neck CT and Barium Swallow  Increase Water  Complete Voice Rest for 4 days  Follow up in 6-8 Weeks with SHANELL Donald

## 2019-07-01 NOTE — PROGRESS NOTES
Otolaryngology Consultation    Patient: Gail Perry  : 1940    Patient presents with:  Hoarse: Pt has been referred by Julien for hoarseness and lump on neck.  It is located on the left side.  She states that it has grown and is choking more often.      HPI:  Gail Perry is a 79 year old female seen today for dysphonia as well as palpable left neck mass.  She has noted a scratchy throat for 2 months.  Voice worse in the morning  Associated occasional solid dysphagia.  She describes food becoming lodged in upper throat  She has been on protonix   EGD  Dr. Bullock    No pharyngitis, no otalgia, no weight loss, never smoker  She denies heartburn  She does occasionally have a globus sensation  She denies chronic throat clearing or any change in voice use    There is no preceding upper respiratory tract infection prior to the onset of the dysphonia  She denies chronic postnasal drainage or congestion    She is  and lives with her  across from [facility]    Left neck ultrasound dated 2019 at [facility] was negative aside from an oval-shaped normal appearing lymph node consistent with the palpable mass.  It has normal architecture  There is no personal history of cancer or family history of lymphoma  No known family history of thyroid cancer or personal history of head neck irradiation    She wears oxygen at night  She has felt somewhat more short of breath with activity over the past several months        Current Outpatient Rx   Medication Sig Dispense Refill     acetaminophen (TYLENOL) 325 MG tablet Take 650 mg by mouth every 4 hours as needed        amitriptyline (ELAVIL) 50 MG tablet Take by mouth At Bedtime       AMLODIPINE BESYLATE PO Take 1 tablet by mouth daily       atorvastatin (LIPITOR) 10 MG tablet Take by mouth daily       gabapentin (NEURONTIN) 100 MG capsule Take by mouth daily       HYDROcodone-acetaminophen (NORCO) 7.5-325 MG per tablet Take 1 tablet by  mouth every 6 hours as needed for moderate to severe pain or severe pain       levothyroxine (SYNTHROID/LEVOTHROID) 100 MCG tablet        oxybutynin (DITROPAN) 5 MG tablet Take by mouth 2 times daily       pantoprazole (PROTONIX) 20 MG EC tablet Take 40 mg by mouth daily       PARoxetine (PAXIL) 40 MG tablet Take 40 mg by mouth       pregabalin (LYRICA) 100 MG capsule Take by mouth 2 times daily         Allergies: Latex; Nickel; Penicillins; and Sulfa drugs     Past Medical History:   Diagnosis Date     Age-related macular degeneration     OD     Anemia     No Comments Provided     Anesthesia of skin     hx difficulty awakening from surgery many years ago (1980)- no problem with recent surgeries     Bunion of great toe of left foot     surgery for right     Chronic obstructive pulmonary disease (H)     mild     Essential (primary) hypertension     No Comments Provided     Frequency of micturition     2006,urinary incontinence     Gastro-esophageal reflux disease without esophagitis     gastritis     Hereditary and idiopathic neuropathy     ideopathic- chronic pain contract- appts every other month; feet and legs     Herpesviral infection     cold sores - none for years     Hyperlipidemia     No Comments Provided     Hypothyroidism     No Comments Provided     Kidney failure     No Comments Provided     Low back pain     No Comments Provided     Major depressive disorder, single episode     No Comments Provided     Melanocytic nevi of scalp and neck     right neck     Methicillin resistant Staphylococcus aureus infection     8/2011,toe L foot ulcerations     Nontoxic single thyroid nodule     noted on US 2/2012, repeat 11/2012 and stable- no f/u recommended     Osteoarthritis     2012,right femoral head, resection     Other specified disorders of bone density and structure, unspecified site (CODE)     No Comments Provided     Peripheral vascular disease (H)     in bilateral legs     Peripheral vascular disease (H)   "   No Comments Provided     Personal history of other medical treatment (CODE)     1980,with hysterectomy     Puckering of right macula     No Comments Provided       Past Surgical History:   Procedure Laterality Date     APPENDECTOMY OPEN      No Comments Provided     ARTHROSCOPY KNEE      2008     BUNIONECTOMY      10/06,Right foot- Pawel     CHOLECYSTECTOMY      9/2010,Ara     COLONOSCOPY  02/01/2010 2/05, 2010     ESOPHAGOSCOPY, GASTROSCOPY, DUODENOSCOPY (EGD), COMBINED      9/13/2012,Dr. Main Bullock- for GI bleed     HYSTERECTOMY TOTAL ABDOMINAL, BILATERAL SALPINGO-OOPHORECTOMY, COMBINED      1980,bleeding- with hx of blood transfusion     OTHER SURGICAL HISTORY      5/7/2012,122999,OTHER,from osteoarthrosis     OTHER SURGICAL HISTORY      9/14/2012,398460,BIOPSY, gastric mild reactive gastropathy, neg for H-pylori     OTHER SURGICAL HISTORY      4/2001,[id number],PREMALIG/BENIGN SKIN LESION EXCISION,excision nevus right neck     OTHER SURGICAL HISTORY      05/07/2012,[id number],TOTAL HIP ARTHROPLASTY,Right     OTHER SURGICAL HISTORY      08/20/2015,[id number],TOE AMPUTATION,Left,Left foot lesser toe - Gwendolyn, 2/24/2016 - Dr Yee     OTHER SURGICAL HISTORY      SUR5,ANTERIOR AND POSTERIOR VAGINAL REPAIR,x2; done in [facility]       ENT family history reviewed    Social History     Tobacco Use     Smoking status: Never Smoker     Smokeless tobacco: Never Used   Substance Use Topics     Alcohol use: Yes     Comment: Alcoholic Drinks/day: once a year     Drug use: Unknown     Types: Other     Comment: Drug use: No       Review of Systems  ROS: 10 point ROS neg other than the symptoms noted above in the HPI and dysphasia constipation eye floaters swollen feet shortness of breath on exertion wheezing and depression    Physical Exam  /86 (BP Location: Left arm, Cuff Size: Adult Large)   Pulse 76   Temp 97.2  F (36.2  C) (Tympanic)   Ht 1.549 m (5' 1\")   Wt 86.2 kg (190 lb)   BMI 35.90 kg/m    General - " The patient is well nourished and well developed, and appears to have good nutritional status.  Alert and oriented to person and place, answers questions and cooperates with examination appropriately.   Head and Face - Normocephalic and atraumatic, with no gross asymmetry noted.  The facial nerve is intact, with strong symmetric movements.  Voice and Breathing - The patient was breathing comfortably without the use of accessory muscles. There was no wheezing, stridor, or stertor.  The patients voice was rough in quality.  No ciaran peripheral digital clubbing or cyanosis   Ears -The external auditory canals are patent, the tympanic membranes are intact without effusion, retraction or mass.  Bony landmarks are intact.  Eyes - Extraocular movements intact, and the pupils were reactive to light.  Sclera were not icteric or injected, conjunctiva were pink and moist.  Mouth - Examination of the oral cavity showed pink, healthy oral mucosa. No lesions or ulcerations noted.  The tongue was mobile and midline, and the dentition were in good condition.    Throat - The walls of the oropharynx were smooth, pink, moist, symmetric, and had no lesions or ulcerations.  The tonsillar pillars and soft palate were symmetric.  The uvula was midline on elevation.    Neck - left lower level III mobile 2.0 cm node. No palpable enlarged fixed cervical lymph nodes.  No neck cysts or unusual tenderness to palpation.   No palpable fixed thyroid nodules or concerning goiter.  The trachea is grossly midline.   Nose - External contour is symmetric, no gross deflection or scars.  Nasal mucosa is pink and moist with no abnormal mucus.  The septum and turbinates were evaluated: grossly non obstructive.  No polyps, masses, or purulence noted on examination.    Attempts at mirror laryngoscopy were not possible due to gag reflex.  Therefore I proceeded with a fiberoptic examination after informed consent.  First I sprayed both sides of the nose with a  mixture of lidocaine and neosynephrine.  I then passed the scope through the nasal cavity.     The nasopharynx was mucosally covered and symmetric.  The eustachian tube openings were unobstructed.  Going further down I had a clear view of the base of tongue which had normal appearing lingual tonsillar tissue.  The base of tongue was free of lesions, and the vallecula was open.  The epiglottis was smooth and mucosally covered.  The supraglottic larynx was then clearly visualized.  The vocal cords moved smoothly and symmetrically and were without mass but with bilateral, symmetric moderate reinkes edema.  The pyriform sinuses were open and without ciaran mass or pooling of secretions upon valsalva, and the limited view of the postcricoid region did not show any lesions.  The patient tolerated the procedure well.      Impression and Plan- Gail Perry is a 79 year old female with:    ICD-10-CM    1. Dysphagia R13.10 CT Soft Tissue Neck w Contrast     XR Esophagram     SPEECH THERAPY REFERRAL   2. Dysphonia R49.0 CT Soft Tissue Neck w Contrast     XR Esophagram   3. 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 Enlarged lymph node in left neck R59.0    5. Dependence on nocturnal oxygen therapy Z99.81          Start Voice Therapy  Complete Neck CT and Barium Swallow  Increase Water  Complete Voice Rest for 4 days  Follow up in 6-8 Weeks with SHANELL Donald  Check TSH with reflex at f/u, hypothyroidism may be leading to dysphonia    If her work-up is negative and she is overly concerned about the left cervical lymph node I can certainly remove this in outpatient surgery this was discussed today as well.  However at her age as long as a CAT scan is not concerning I would recommend surveillance with a repeat ultrasound in 4 to 6 months      Nanette Jacobson D.O.  Otolaryngology/Head and Neck Surgery  Allergy

## 2019-07-01 NOTE — NURSING NOTE
"Chief Complaint   Patient presents with     Hoarse     Pt has been referred by Julien for hoarseness and lump on neck.  It is located on the left side.  She states that it has grown and is choking more often.       Initial /86 (BP Location: Left arm, Cuff Size: Adult Large)   Pulse 76   Temp 97.2  F (36.2  C) (Tympanic)   Ht 1.549 m (5' 1\")   Wt 86.2 kg (190 lb)   BMI 35.90 kg/m   Estimated body mass index is 35.9 kg/m  as calculated from the following:    Height as of this encounter: 1.549 m (5' 1\").    Weight as of this encounter: 86.2 kg (190 lb).  Medication Reconciliation: complete   Tania Cevallos LPN      "

## 2019-07-01 NOTE — LETTER
2019         RE: Gail Perry  [address]  [facility] [address]        Dear Colleague,    Thank you for referring your patient, Gail Perry, to the [facility]. Please see a copy of my visit note below.    Otolaryngology Consultation    Patient: Gail Perry  : 1940    Patient presents with:  Hoarse: Pt has been referred by Julien for hoarseness and lump on neck.  It is located on the left side.  She states that it has grown and is choking more often.      HPI:  Gail Perry is a 79 year old female seen today for dysphonia as well as palpable left neck mass.  She has noted a scratchy throat for 2 months.  Voice worse in the morning  Associated occasional solid dysphagia.  She describes food becoming lodged in upper throat  She has been on protonix   EGD  Dr. Bullock    No pharyngitis, no otalgia, no weight loss, never smoker  She denies heartburn  She does occasionally have a globus sensation  She denies chronic throat clearing or any change in voice use    There is no preceding upper respiratory tract infection prior to the onset of the dysphonia  She denies chronic postnasal drainage or congestion    She is  and lives with her  across from [facility]    Left neck ultrasound dated 2019 at [place] was negative aside from an oval-shaped normal appearing lymph node consistent with the palpable mass.  It has normal architecture  There is no personal history of cancer or family history of lymphoma  No known family history of thyroid cancer or personal history of head neck irradiation    She wears oxygen at night  She has felt somewhat more short of breath with activity over the past several months        Current Outpatient Rx   Medication Sig Dispense Refill     acetaminophen (TYLENOL) 325 MG tablet Take 650 mg by mouth every 4 hours as needed        amitriptyline (ELAVIL) 50 MG tablet Take by mouth At Bedtime        AMLODIPINE BESYLATE PO Take 1 tablet by mouth daily       atorvastatin (LIPITOR) 10 MG tablet Take by mouth daily       gabapentin (NEURONTIN) 100 MG capsule Take by mouth daily       HYDROcodone-acetaminophen (NORCO) 7.5-325 MG per tablet Take 1 tablet by mouth every 6 hours as needed for moderate to severe pain or severe pain       levothyroxine (SYNTHROID/LEVOTHROID) 100 MCG tablet        oxybutynin (DITROPAN) 5 MG tablet Take by mouth 2 times daily       pantoprazole (PROTONIX) 20 MG EC tablet Take 40 mg by mouth daily       PARoxetine (PAXIL) 40 MG tablet Take 40 mg by mouth       pregabalin (LYRICA) 100 MG capsule Take by mouth 2 times daily         Allergies: Latex; Nickel; Penicillins; and Sulfa drugs     Past Medical History:   Diagnosis Date     Age-related macular degeneration     OD     Anemia     No Comments Provided     Anesthesia of skin     hx difficulty awakening from surgery many years ago (1980)- no problem with recent surgeries     Bunion of great toe of left foot     surgery for right     Chronic obstructive pulmonary disease (H)     mild     Essential (primary) hypertension     No Comments Provided     Frequency of micturition     2006,urinary incontinence     Gastro-esophageal reflux disease without esophagitis     gastritis     Hereditary and idiopathic neuropathy     ideopathic- chronic pain contract- appts every other month; feet and legs     Herpesviral infection     cold sores - none for years     Hyperlipidemia     No Comments Provided     Hypothyroidism     No Comments Provided     Kidney failure     No Comments Provided     Low back pain     No Comments Provided     Major depressive disorder, single episode     No Comments Provided     Melanocytic nevi of scalp and neck     right neck     Methicillin resistant Staphylococcus aureus infection     8/2011,toe L foot ulcerations     Nontoxic single thyroid nodule     noted on US 2/2012, repeat 11/2012 and stable- no f/u recommended      Osteoarthritis     2012,right femoral head, resection     Other specified disorders of bone density and structure, unspecified site (CODE)     No Comments Provided     Peripheral vascular disease (H)     in bilateral legs     Peripheral vascular disease (H)     No Comments Provided     Personal history of other medical treatment (CODE)     1980,with hysterectomy     Puckering of right macula     No Comments Provided       Past Surgical History:   Procedure Laterality Date     APPENDECTOMY OPEN      No Comments Provided     ARTHROSCOPY KNEE      2008     BUNIONECTOMY      10/06,Right foot- Pawel     CHOLECYSTECTOMY      9/2010,Ara     COLONOSCOPY  02/01/2010 2/05, 2010     ESOPHAGOSCOPY, GASTROSCOPY, DUODENOSCOPY (EGD), COMBINED      9/13/2012,Dr. Main Bullock- for GI bleed     HYSTERECTOMY TOTAL ABDOMINAL, BILATERAL SALPINGO-OOPHORECTOMY, COMBINED      1980,bleeding- with hx of blood transfusion     OTHER SURGICAL HISTORY      5/7/2012,328243,OTHER,from osteoarthrosis     OTHER SURGICAL HISTORY      9/14/2012,719879,BIOPSY, gastric mild reactive gastropathy, neg for H-pylori     OTHER SURGICAL HISTORY      4/2001,[id number],PREMALIG/BENIGN SKIN LESION EXCISION,excision nevus right neck     OTHER SURGICAL HISTORY      05/07/2012,[id number],TOTAL HIP ARTHROPLASTY,Right     OTHER SURGICAL HISTORY      08/20/2015,[id number],TOE AMPUTATION,Left,Left foot lesser toe - [place], 2/24/2016 - Dr Yee     OTHER SURGICAL HISTORY      SUR5,ANTERIOR AND POSTERIOR VAGINAL REPAIR,x2; done in [facility]       ENT family history reviewed    Social History     Tobacco Use     Smoking status: Never Smoker     Smokeless tobacco: Never Used   Substance Use Topics     Alcohol use: Yes     Comment: Alcoholic Drinks/day: once a year     Drug use: Unknown     Types: Other     Comment: Drug use: No       Review of Systems  ROS: 10 point ROS neg other than the symptoms noted above in the HPI and dysphasia constipation eye floaters swollen  "feet shortness of breath on exertion wheezing and depression    Physical Exam  /86 (BP Location: Left arm, Cuff Size: Adult Large)   Pulse 76   Temp 97.2  F (36.2  C) (Tympanic)   Ht 1.549 m (5' 1\")   Wt 86.2 kg (190 lb)   BMI 35.90 kg/m     General - The patient is well nourished and well developed, and appears to have good nutritional status.  Alert and oriented to person and place, answers questions and cooperates with examination appropriately.   Head and Face - Normocephalic and atraumatic, with no gross asymmetry noted.  The facial nerve is intact, with strong symmetric movements.  Voice and Breathing - The patient was breathing comfortably without the use of accessory muscles. There was no wheezing, stridor, or stertor.  The patients voice was rough in quality.  No ciaran peripheral digital clubbing or cyanosis   Ears -The external auditory canals are patent, the tympanic membranes are intact without effusion, retraction or mass.  Bony landmarks are intact.  Eyes - Extraocular movements intact, and the pupils were reactive to light.  Sclera were not icteric or injected, conjunctiva were pink and moist.  Mouth - Examination of the oral cavity showed pink, healthy oral mucosa. No lesions or ulcerations noted.  The tongue was mobile and midline, and the dentition were in good condition.    Throat - The walls of the oropharynx were smooth, pink, moist, symmetric, and had no lesions or ulcerations.  The tonsillar pillars and soft palate were symmetric.  The uvula was midline on elevation.    Neck - left lower level III mobile 2.0 cm node. No palpable enlarged fixed cervical lymph nodes.  No neck cysts or unusual tenderness to palpation.   No palpable fixed thyroid nodules or concerning goiter.  The trachea is grossly midline.   Nose - External contour is symmetric, no gross deflection or scars.  Nasal mucosa is pink and moist with no abnormal mucus.  The septum and turbinates were evaluated: grossly non " obstructive.  No polyps, masses, or purulence noted on examination.    Attempts at mirror laryngoscopy were not possible due to gag reflex.  Therefore I proceeded with a fiberoptic examination after informed consent.  First I sprayed both sides of the nose with a mixture of lidocaine and neosynephrine.  I then passed the scope through the nasal cavity.     The nasopharynx was mucosally covered and symmetric.  The eustachian tube openings were unobstructed.  Going further down I had a clear view of the base of tongue which had normal appearing lingual tonsillar tissue.  The base of tongue was free of lesions, and the vallecula was open.  The epiglottis was smooth and mucosally covered.  The supraglottic larynx was then clearly visualized.  The vocal cords moved smoothly and symmetrically and were without mass but with bilateral, symmetric moderate reinkes edema.  The pyriform sinuses were open and without ciaran mass or pooling of secretions upon valsalva, and the limited view of the postcricoid region did not show any lesions.  The patient tolerated the procedure well.      Impression and Plan- Gail Perry is a 79 year old female with:    ICD-10-CM    1. Dysphagia R13.10 CT Soft Tissue Neck w Contrast     XR Esophagram     SPEECH THERAPY REFERRAL   2. Dysphonia R49.0 CT Soft Tissue Neck w Contrast     XR Esophagram   3. 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 Enlarged lymph node in left neck R59.0    5. Dependence on nocturnal oxygen therapy Z99.81          Start Voice Therapy  Complete Neck CT and Barium Swallow  Increase Water  Complete Voice Rest for 4 days  Follow up in 6-8 Weeks with SHANELL Donald  Check TSH with reflex at f/u, hypothyroidism may be leading to dysphonia    If her work-up is negative and she is overly concerned about the left cervical lymph node I can certainly remove this in outpatient surgery this was discussed today as well.  However at her age as long as a CAT scan is not  concerning I would recommend surveillance with a repeat ultrasound in 4 to 6 months      Nanette Jacobson D.O.  Otolaryngology/Head and Neck Surgery  Allergy      Again, thank you for allowing me to participate in the care of your patient.        Sincerely,        Nanette Jacobson MD

## 2019-07-11 ENCOUNTER — TELEPHONE (OUTPATIENT)
Dept: OTOLARYNGOLOGY | Facility: OTHER | Age: 79
End: 2019-07-11

## 2019-07-11 DIAGNOSIS — R13.10 DYSPHAGIA, UNSPECIFIED TYPE: Primary | ICD-10-CM

## 2019-07-11 DIAGNOSIS — R49.0 DYSPHONIA: ICD-10-CM

## 2019-07-11 DIAGNOSIS — J38.1 REINKE'S EDEMA OF VOCAL FOLDS: ICD-10-CM

## 2019-07-11 DIAGNOSIS — R59.0 ENLARGED LYMPH NODE IN NECK: ICD-10-CM

## 2019-07-18 ENCOUNTER — HOSPITAL ENCOUNTER (OUTPATIENT)
Dept: CT IMAGING | Facility: HOSPITAL | Age: 79
Discharge: HOME | End: 2019-07-18
Attending: OTOLARYNGOLOGY | Admitting: OTOLARYNGOLOGY
Payer: MEDICARE

## 2019-07-18 ENCOUNTER — HOSPITAL ENCOUNTER (OUTPATIENT)
Dept: GENERAL RADIOLOGY | Facility: HOSPITAL | Age: 79
End: 2019-07-18
Attending: OTOLARYNGOLOGY
Payer: MEDICARE

## 2019-07-18 DIAGNOSIS — R13.10 DYSPHAGIA, UNSPECIFIED TYPE: ICD-10-CM

## 2019-07-18 DIAGNOSIS — R49.0 DYSPHONIA: ICD-10-CM

## 2019-07-18 PROCEDURE — 25500064 ZZH RX 255 OP 636: Performed by: RADIOLOGY

## 2019-07-18 PROCEDURE — 74220 X-RAY XM ESOPHAGUS 1CNTRST: CPT | Mod: TC

## 2019-07-18 PROCEDURE — 70491 CT SOFT TISSUE NECK W/DYE: CPT | Mod: TC

## 2019-07-18 RX ORDER — IOPAMIDOL 612 MG/ML
100 INJECTION, SOLUTION INTRAVASCULAR ONCE
Status: COMPLETED | OUTPATIENT
Start: 2019-07-18 | End: 2019-07-18

## 2019-07-18 RX ADMIN — IOPAMIDOL 100 ML: 612 INJECTION, SOLUTION INTRAVENOUS at 11:19

## 2019-07-22 NOTE — TELEPHONE ENCOUNTER
Attempted to call the patient x3 with no return call. Will wait for the patient to call back to re-order scans.   
Received a call from [facility] stating that the no longer do the ordered scans. A message was left for the patient to call back to let us know where else she would like to go. Awaiting response.   
1-2 drinks

## 2019-07-30 DIAGNOSIS — R13.10 DYSPHAGIA: Primary | ICD-10-CM

## 2019-07-30 DIAGNOSIS — R49.0 DYSPHONIA: ICD-10-CM

## 2019-08-06 ENCOUNTER — OFFICE VISIT (OUTPATIENT)
Dept: SURGERY | Facility: OTHER | Age: 79
End: 2019-08-06
Attending: OTOLARYNGOLOGY
Payer: MEDICARE

## 2019-08-06 VITALS
TEMPERATURE: 97.5 F | BODY MASS INDEX: 35.87 KG/M2 | HEIGHT: 61 IN | HEART RATE: 95 BPM | WEIGHT: 190 LBS | DIASTOLIC BLOOD PRESSURE: 80 MMHG | OXYGEN SATURATION: 94 % | SYSTOLIC BLOOD PRESSURE: 134 MMHG

## 2019-08-06 DIAGNOSIS — R13.19 ESOPHAGEAL DYSPHAGIA: Primary | ICD-10-CM

## 2019-08-06 PROCEDURE — G0463 HOSPITAL OUTPT CLINIC VISIT: HCPCS

## 2019-08-06 PROCEDURE — 99204 OFFICE O/P NEW MOD 45 MIN: CPT | Performed by: SURGERY

## 2019-08-06 ASSESSMENT — MIFFLIN-ST. JEOR: SCORE: 1284.21

## 2019-08-06 ASSESSMENT — PAIN SCALES - GENERAL: PAINLEVEL: MODERATE PAIN (4)

## 2019-08-06 NOTE — NURSING NOTE
"Chief Complaint   Patient presents with     Consult     Dysphagia,Dysphonia-left neck mass-Dr Jacobson is referring       Initial /80 (BP Location: Left arm, Patient Position: Chair, Cuff Size: Adult Regular)   Pulse 95   Temp 97.5  F (36.4  C) (Tympanic)   Ht 1.549 m (5' 1\")   Wt 86.2 kg (190 lb)   SpO2 94%   BMI 35.90 kg/m   Estimated body mass index is 35.9 kg/m  as calculated from the following:    Height as of this encounter: 1.549 m (5' 1\").    Weight as of this encounter: 86.2 kg (190 lb).  Medication Reconciliation: complete   Meme Goldstein      "

## 2019-08-06 NOTE — PATIENT INSTRUCTIONS
Thank you for allowing Dr. Burnett and our surgical team to participate in your care.  If you have a scheduling or an appointment question please contact our Health Unit Coordinator, Angelina,  at her direct line [phone].   ALL nursing questions or concerns can be directed to your surgical nurse at: [phone] -Emelia    You are scheduled for a: Upper Endoscopy, Possible Biopsy  Your procedure date is: August 29, 2019    Your time is yet to be determined.  Same day surgery will call you the day prior to your procedure with your time to be in admitting.          HOW TO PREPARE-      You need to have a scheduled Pre-Op with your primary care physician within 30 days of your scheduled surgery. Please call as soon as possible to schedule this.       You need a friend or family member available to drive you home AND stay with you for 24 hours after you leave the hospital. You will not be allowed to drive yourself. IF you need to take a taxi or the bus you MUST have a responsible person to ride with you. YOUR PROCEDURE WILL BE CANCELLED IF YOU DO NOT HAVE A RESPONSIBLE ADULT TO DRIVE YOU HOME.       You CANNOT have anything to eat or drink after midnight the night before your surgery, ncluding water and coffee. Your stomach needs to be completely empty. Do NOT chew gum, suck on hard candy, or smoke. You can brush your teeth the morning of surgery.       You need to call our Surgery Education Nurses 1-2 weeks prior to your surgery date at  [phone] or toll free [phone]. Please have you medication and allergy lists ready.      Stop your aspirin or other NSAIDs(Ibuprofen, Motrin, Aleve, Celebrex, Naproxen, etc...) 7 days before your surgery.  Tylenol is safe to take.        Hospital admitting will call you the day before your surgery with your arrival time. If you are scheduled on a Monday admitting will call you the Friday before.      Please call your primary care physician if you should become ill within 24  hours of scheduled surgery. (ex.vomiting, diarrhea, fever)          You will need to wash the night before AND the morning of you procedure with the supplied Hibiclens. Wash your Surgical area with your bare hands, apply friction and rinse. KEEP IT AWAY FROM YOUR EYES, EARS, NOSE AND MOUTH.

## 2019-08-07 NOTE — PROGRESS NOTES
[facility] Surgery Consultation    CC:  Hoarseness, dysphagia     HPI:  This 79 year old year old female is seen at the request of Dr. Jacobson for evaluation of swallowing difficulty and horseness. She reports that this has been going on for about 3 months. She also reports of a lump on the left side of her neck. She also describes a recent episode while out to eat when she got a bolus of rice stuck in her throat causing her a coughing episode to get up. She does admit to episodes of difficulty initiating swallowing. She admits to a horse voice as well. She recently seen by ENT who had ordered a neck CT and esophagram.      Past Medical History:   Diagnosis Date     Age-related macular degeneration     OD     Anemia     No Comments Provided     Anesthesia of skin     hx difficulty awakening from surgery many years ago (1980)- no problem with recent surgeries     Bunion of great toe of left foot     surgery for right     Chronic obstructive pulmonary disease (H)     mild     Essential (primary) hypertension     No Comments Provided     Frequency of micturition     2006,urinary incontinence     Gastro-esophageal reflux disease without esophagitis     gastritis     Hereditary and idiopathic neuropathy     ideopathic- chronic pain contract- appts every other month; feet and legs     Herpesviral infection     cold sores - none for years     Hyperlipidemia     No Comments Provided     Hypothyroidism     No Comments Provided     Kidney failure     No Comments Provided     Low back pain     No Comments Provided     Major depressive disorder, single episode     No Comments Provided     Melanocytic nevi of scalp and neck     right neck     Methicillin resistant Staphylococcus aureus infection     8/2011,toe L foot ulcerations     Nontoxic single thyroid nodule     noted on US 2/2012, repeat 11/2012 and stable- no f/u recommended     Osteoarthritis     2012,right femoral head, resection     Other specified disorders of  bone density and structure, unspecified site (CODE)     No Comments Provided     Peripheral vascular disease (H)     in bilateral legs     Peripheral vascular disease (H)     No Comments Provided     Personal history of other medical treatment (CODE)     1980,with hysterectomy     Puckering of right macula     No Comments Provided       Past Surgical History:   Procedure Laterality Date     APPENDECTOMY OPEN      No Comments Provided     ARTHROSCOPY KNEE      2008     BUNIONECTOMY      10/06,Right foot- Pawel     CHOLECYSTECTOMY      9/2010,Ara     COLONOSCOPY  02/01/2010 2/05, 2010     ESOPHAGOSCOPY, GASTROSCOPY, DUODENOSCOPY (EGD), COMBINED      9/13/2012,Dr. Main Bullock- for GI bleed     HYSTERECTOMY TOTAL ABDOMINAL, BILATERAL SALPINGO-OOPHORECTOMY, COMBINED      1980,bleeding- with hx of blood transfusion     OTHER SURGICAL HISTORY      5/7/2012,[id number],OTHER,from osteoarthrosis     OTHER SURGICAL HISTORY      9/14/2012,[id number],BIOPSY, gastric mild reactive gastropathy, neg for H-pylori     OTHER SURGICAL HISTORY      4/2001,[id number],PREMALIG/BENIGN SKIN LESION EXCISION,excision nevus right neck     OTHER SURGICAL HISTORY      05/07/2012,DOM197,TOTAL HIP ARTHROPLASTY,Right     OTHER SURGICAL HISTORY      08/20/2015,[id number],TOE AMPUTATION,Left,Left foot lesser toe - [facility], 2/24/2016 - Dr Yee     OTHER SURGICAL HISTORY      SUR5,ANTERIOR AND POSTERIOR VAGINAL REPAIR,x2; done in [facility]       Allergies   Allergen Reactions     Latex Hives     Other reaction(s): Angioedema  Affected around mouth while at dental office (when latex gloves used)  Other reaction(s): Angioedema  Affected around mouth while at dental office (when latex gloves used)  Rash around mouth     Nickel Unknown     rash     Penicillins Hives     Sulfa Drugs Other (See Comments)     Yellow around eyes; ? Liver issues       Current Outpatient Medications   Medication     acetaminophen (TYLENOL) 325 MG tablet     amitriptyline (ELAVIL)  "50 MG tablet     AMLODIPINE BESYLATE PO     atorvastatin (LIPITOR) 10 MG tablet     gabapentin (NEURONTIN) 100 MG capsule     HYDROcodone-acetaminophen (NORCO) 7.5-325 MG per tablet     levothyroxine (SYNTHROID/LEVOTHROID) 100 MCG tablet     oxybutynin (DITROPAN) 5 MG tablet     pantoprazole (PROTONIX) 20 MG EC tablet     PARoxetine (PAXIL) 40 MG tablet     pregabalin (LYRICA) 100 MG capsule     No current facility-administered medications for this visit.        HABITS:    Social History     Tobacco Use     Smoking status: Never Smoker     Smokeless tobacco: Never Used   Substance Use Topics     Alcohol use: Yes     Comment: Alcoholic Drinks/day: once a year     Drug use: Unknown     Types: Other     Comment: Drug use: No       Family History   Problem Relation Age of Onset     Heart Disease Mother         Heart Disease     Hypertension Father         Hypertension     Other - See Comments Father         Pulmonary Emboli     Other - See Comments Daughter         No Known Problems     Breast Cancer Sister         Cancer-breast     Other - See Comments Sister         adopted sister     Breast Cancer Daughter         Cancer-breast,total mastectomy Age 36     Other - See Comments Daughter         OP cerebellar atrophy       REVIEW OF SYSTEMS:  Ten point review of systems negative except those mentioned in the HPI.     OBJECTIVE:    /80 (BP Location: Left arm, Patient Position: Chair, Cuff Size: Adult Regular)   Pulse 95   Temp 97.5  F (36.4  C) (Tympanic)   Ht 1.549 m (5' 1\")   Wt 86.2 kg (190 lb)   SpO2 94%   BMI 35.90 kg/m      GENERAL: Generally appears well, in no distress with appropriate affect.  HEENT:   Sclerae anicteric - normocephalic atraumatic, left neck there is a palpable region that is pulsatile, It does not move with swallowing.    Respiratory:  No acute distress, no splinting   Cardiovascular:  Regular Rate and Rhythm  :  deferred  Extremities:  Extremities normal. No deformities, edema, or " skin discoloration.  Skin:  no suspicious lesions or rashes  Neurological: grossly intact  Psych:  Alert, oriented, affect appropriate with normal decision making ability.    IMPRESSION:    Dysphagia and horseness. Reviewed CT and esophagram as well as examined neck and while difficult to be sure this may be her carotid artery she is feeling on her left neck. It appears quite tortuous on CT but does not have an abnormal anatomic takeoff by my view. The esophagram shows abnormal swallow pattern, I do have concerns about possible dysmotility or possible eosinophilic esophagitis. Hiatal hernia small, described as sliding.  Discussed avoiding foods that can gum up easily like pork and rice. Would like her to get a pre-op from PCP prior as she is not in our system and has quite a few medical issues.    PLAN:    EGD with biopsy   Pre-op first    Pepito Burnett MD,     8/7/2019  10:40 AM

## 2019-08-15 PROBLEM — K21.9 GASTROESOPHAGEAL REFLUX DISEASE: Status: ACTIVE | Noted: 2019-08-15

## 2019-08-15 PROBLEM — Z96.651 STATUS POST TOTAL RIGHT KNEE REPLACEMENT: Status: ACTIVE | Noted: 2019-02-21

## 2019-08-15 PROBLEM — E07.9 THYROID DISEASE: Status: ACTIVE | Noted: 2019-08-15

## 2019-08-15 PROBLEM — R94.31 ABNORMAL ECG: Status: ACTIVE | Noted: 2018-07-12

## 2019-08-15 PROBLEM — I10 HYPERTENSION: Status: ACTIVE | Noted: 2019-08-15

## 2019-08-15 PROBLEM — M17.11 PRIMARY OSTEOARTHRITIS OF RIGHT KNEE: Status: ACTIVE | Noted: 2018-05-04

## 2019-08-15 PROBLEM — F32.A DEPRESSION: Status: ACTIVE | Noted: 2019-08-15

## 2019-08-15 PROBLEM — E03.9 HYPOTHYROIDISM: Status: ACTIVE | Noted: 2019-08-15

## 2019-08-22 ENCOUNTER — ANESTHESIA EVENT (OUTPATIENT)
Dept: SURGERY | Facility: HOSPITAL | Age: 79
End: 2019-08-22
Payer: MEDICARE

## 2019-08-22 ASSESSMENT — COPD QUESTIONNAIRES: COPD: 1

## 2019-08-22 NOTE — ANESTHESIA PREPROCEDURE EVALUATION
Anesthesia Pre-Procedure Evaluation    Patient: Gail Perry   MRN: [id number] : 1940          Preoperative Diagnosis: ESOPHAGEAL DYSPHAGIA    Procedure(s):  UPPER ENDOSCOPY    Past Medical History:   Diagnosis Date     Age-related macular degeneration     OD     Anemia     No Comments Provided     Anesthesia of skin     hx difficulty awakening from surgery many years ago ()- no problem with recent surgeries     Bunion of great toe of left foot     surgery for right     Chronic obstructive pulmonary disease (H)     mild     Essential (primary) hypertension     No Comments Provided     Frequency of micturition     ,urinary incontinence     Gastro-esophageal reflux disease without esophagitis     gastritis     Hereditary and idiopathic neuropathy     ideopathic- chronic pain contract- appts every other month; feet and legs     Herpesviral infection     cold sores - none for years     Hyperlipidemia     No Comments Provided     Hypothyroidism     No Comments Provided     Kidney failure     No Comments Provided     Low back pain     No Comments Provided     Major depressive disorder, single episode     No Comments Provided     Melanocytic nevi of scalp and neck     right neck     Methicillin resistant Staphylococcus aureus infection     2011,toe L foot ulcerations     Nontoxic single thyroid nodule     noted on US 2012, repeat 2012 and stable- no f/u recommended     Osteoarthritis     ,right femoral head, resection     Other specified disorders of bone density and structure, unspecified site (CODE)     No Comments Provided     Peripheral vascular disease (H)     in bilateral legs     Peripheral vascular disease (H)     No Comments Provided     Personal history of other medical treatment (CODE)     ,with hysterectomy     Puckering of right macula     No Comments Provided     Past Surgical History:   Procedure Laterality Date     APPENDECTOMY OPEN      No Comments Provided      ARTHROSCOPY KNEE      2008     BUNIONECTOMY      10/06,Right foot- Pawel     CHOLECYSTECTOMY      9/2010,Ara     COLONOSCOPY  02/01/2010 2/05, 2010     ESOPHAGOSCOPY, GASTROSCOPY, DUODENOSCOPY (EGD), COMBINED      9/13/2012,Dr. Main Bullock- for GI bleed     HYSTERECTOMY TOTAL ABDOMINAL, BILATERAL SALPINGO-OOPHORECTOMY, COMBINED      1980,bleeding- with hx of blood transfusion     OTHER SURGICAL HISTORY      5/7/2012,[id number],OTHER,from osteoarthrosis     OTHER SURGICAL HISTORY      9/14/2012,[id number],BIOPSY, gastric mild reactive gastropathy, neg for H-pylori     OTHER SURGICAL HISTORY      4/2001,[id number],PREMALIG/BENIGN SKIN LESION EXCISION,excision nevus right neck     OTHER SURGICAL HISTORY      05/07/2012,[id number],TOTAL HIP ARTHROPLASTY,Right     OTHER SURGICAL HISTORY      08/20/2015,[id number],TOE AMPUTATION,Left,Left foot lesser toe - Gwendolyn, 2/24/2016 - Dr Yee     OTHER SURGICAL HISTORY      SUR5,ANTERIOR AND POSTERIOR VAGINAL REPAIR,x2; done in [facility]       Anesthesia Evaluation     . Pt has had prior anesthetic.     No history of anesthetic complications          ROS/MED HX    ENT/Pulmonary:     (+)severe COPD, O2 dependent, during Nighttime PRN liters/min,  , . .   (-) asthma   Neurologic:     (+)neuropathy - Idiopathic neuropathy,     Cardiovascular:     (+) Dyslipidemia, hypertension-Peripheral Vascular Disease---. : . . . :. . Previous cardiac testing date:results:Stress Testdate:7-2-18 results:FINDINGS: There is good uptake of activity by the left ventricle. The  left ventricle is normal in size.     There are no areas of reversible myocardial perfusion deficit to  suggest ischemia. Diminished activity within the inferolateral apex is  fixed, not associated with significant hypomotility.     Myocardial motility is preserved. The ejection fraction is normal, at  72%.                                                                   IMPRESSION: No evidence of myocardial ischemia. Diminished  perfusion  at the apex is fixed but without hypomotility likely reflects  artifact. Preserved ejection fraction.   ECG reviewed date:10-2-17 results:Interpretation: Normal sinus rhythm with a rate of 64.  Q waves noted in leads 3 and aVF.  ST segment abnormality with T-wave inversion in V2 and V3 and flattening through V5.  QRS, QTC and CT intervals are all normal.    Impression: Abnormal EKG.  When compared to prior tracing dated August 14, 2015, unchanged. date: results:          METS/Exercise Tolerance:     Hematologic:     (+) History of Transfusion -      Musculoskeletal: Comment: S/P right TKA, arthropathy  (+) arthritis,  other musculoskeletal- DJD      GI/Hepatic:     (+) GERD       Renal/Genitourinary: Comment: Renal failure and stress incontinence    (+) chronic renal disease,       Endo:     (+) thyroid problem hypothyroidism, .      Psychiatric:     (+) psychiatric history depression      Infectious Disease:   (+) MRSA,       Malignancy:      - no malignancy   Other:    - neg other ROS                      Physical Exam  Normal systems: cardiovascular and pulmonary    Airway   Mallampati: IV  TM distance: >3 FB  Neck ROM: full    Dental   (+) upper dentures and lower dentures    Cardiovascular   Rhythm and rate: regular and normal      Pulmonary    breath sounds clear to auscultation            Lab Results   Component Value Date    HGB 10.5 (L) 10/02/2017    HCT 33.0 10/02/2017     10/02/2017     10/02/2017    POTASSIUM 5.3 06/19/2019    CHLORIDE 107 10/02/2017    CO2 26 10/02/2017    BUN 26 (H) 10/02/2017    CR 1.7 (H) 06/19/2019     (H) 06/19/2019    CAMRYN 9.3 10/02/2017       Preop Vitals  BP Readings from Last 3 Encounters:   08/06/19 134/80   07/01/19 148/86   10/02/17 138/90    Pulse Readings from Last 3 Encounters:   08/06/19 95   07/01/19 76   10/02/17 64      Resp Readings from Last 3 Encounters:   12/27/16 12   10/24/16 16   09/12/16 12    SpO2 Readings from Last 3 Encounters:  "  08/06/19 94%   10/02/17 91%      Temp Readings from Last 1 Encounters:   08/06/19 97.5  F (36.4  C) (Tympanic)    Ht Readings from Last 1 Encounters:   08/06/19 1.549 m (5' 1\")      Wt Readings from Last 1 Encounters:   08/06/19 86.2 kg (190 lb)    Estimated body mass index is 35.9 kg/m  as calculated from the following:    Height as of 8/6/19: 1.549 m (5' 1\").    Weight as of 8/6/19: 86.2 kg (190 lb).       Anesthesia Plan      History & Physical Review  History and physical reviewed and following examination; no interval change.    ASA Status:  4 .        Plan for MAC with Intravenous and Propofol induction. Maintenance will be TIVA.  Reason for MAC:  Deep or markedly invasive procedure (G8), Procedure to face, neck, head or breast, Chronic cardiopulmonary disease (G9) and Other - see comments  PONV prophylaxis:  Ondansetron (or other 5HT-3)  Surgeon requests deep sedation. Patient is an ASA 4 and has advanced age >70. Will provide MAC.      Postoperative Care  Postoperative pain management:  IV analgesics.      Consents  Anesthetic plan, risks, benefits and alternatives discussed with:  Patient..                 MORA Vasques CRNA  "

## 2019-08-29 ENCOUNTER — HOSPITAL ENCOUNTER (OUTPATIENT)
Facility: HOSPITAL | Age: 79
Discharge: HOME | End: 2019-08-29
Attending: SURGERY | Admitting: SURGERY
Payer: MEDICARE

## 2019-08-29 ENCOUNTER — ANESTHESIA (OUTPATIENT)
Dept: SURGERY | Facility: HOSPITAL | Age: 79
End: 2019-08-29
Payer: MEDICARE

## 2019-08-29 VITALS
HEART RATE: 91 BPM | TEMPERATURE: 97.1 F | DIASTOLIC BLOOD PRESSURE: 71 MMHG | HEIGHT: 61 IN | WEIGHT: 182 LBS | RESPIRATION RATE: 20 BRPM | BODY MASS INDEX: 34.36 KG/M2 | OXYGEN SATURATION: 94 % | SYSTOLIC BLOOD PRESSURE: 129 MMHG

## 2019-08-29 PROCEDURE — 88305 TISSUE EXAM BY PATHOLOGIST: CPT | Mod: TC | Performed by: SURGERY

## 2019-08-29 PROCEDURE — 27210794 ZZH OR GENERAL SUPPLY STERILE: Performed by: SURGERY

## 2019-08-29 PROCEDURE — 71000027 ZZH RECOVERY PHASE 2 EACH 15 MINS: Performed by: SURGERY

## 2019-08-29 PROCEDURE — 99100 ANES PT EXTEME AGE<1 YR&>70: CPT | Performed by: NURSE ANESTHETIST, CERTIFIED REGISTERED

## 2019-08-29 PROCEDURE — 43239 EGD BIOPSY SINGLE/MULTIPLE: CPT | Performed by: SURGERY

## 2019-08-29 PROCEDURE — 43239 EGD BIOPSY SINGLE/MULTIPLE: CPT | Performed by: NURSE ANESTHETIST, CERTIFIED REGISTERED

## 2019-08-29 PROCEDURE — 25000125 ZZHC RX 250: Performed by: NURSE ANESTHETIST, CERTIFIED REGISTERED

## 2019-08-29 PROCEDURE — 36000050 ZZH SURGERY LEVEL 2 1ST 30 MIN: Performed by: SURGERY

## 2019-08-29 PROCEDURE — 37000008 ZZH ANESTHESIA TECHNICAL FEE, 1ST 30 MIN: Performed by: SURGERY

## 2019-08-29 PROCEDURE — 43239 EGD BIOPSY SINGLE/MULTIPLE: CPT | Performed by: ANESTHESIOLOGY

## 2019-08-29 PROCEDURE — 25800030 ZZH RX IP 258 OP 636: Performed by: NURSE ANESTHETIST, CERTIFIED REGISTERED

## 2019-08-29 PROCEDURE — 25000125 ZZHC RX 250: Performed by: SURGERY

## 2019-08-29 PROCEDURE — 40000306 ZZH STATISTIC PRE PROC ASSESS II: Performed by: SURGERY

## 2019-08-29 PROCEDURE — 25000128 H RX IP 250 OP 636: Performed by: NURSE ANESTHETIST, CERTIFIED REGISTERED

## 2019-08-29 RX ORDER — LIDOCAINE 40 MG/G
CREAM TOPICAL
Status: DISCONTINUED | OUTPATIENT
Start: 2019-08-29 | End: 2019-08-29 | Stop reason: HOSPADM

## 2019-08-29 RX ORDER — NALOXONE HYDROCHLORIDE 0.4 MG/ML
.1-.4 INJECTION, SOLUTION INTRAMUSCULAR; INTRAVENOUS; SUBCUTANEOUS
Status: DISCONTINUED | OUTPATIENT
Start: 2019-08-29 | End: 2019-08-29 | Stop reason: HOSPADM

## 2019-08-29 RX ORDER — ONDANSETRON 2 MG/ML
4 INJECTION INTRAMUSCULAR; INTRAVENOUS EVERY 30 MIN PRN
Status: DISCONTINUED | OUTPATIENT
Start: 2019-08-29 | End: 2019-08-29 | Stop reason: HOSPADM

## 2019-08-29 RX ORDER — LIDOCAINE HYDROCHLORIDE 20 MG/ML
INJECTION, SOLUTION INFILTRATION; PERINEURAL PRN
Status: DISCONTINUED | OUTPATIENT
Start: 2019-08-29 | End: 2019-08-29

## 2019-08-29 RX ORDER — SODIUM CHLORIDE, SODIUM LACTATE, POTASSIUM CHLORIDE, CALCIUM CHLORIDE 600; 310; 30; 20 MG/100ML; MG/100ML; MG/100ML; MG/100ML
INJECTION, SOLUTION INTRAVENOUS CONTINUOUS
Status: DISCONTINUED | OUTPATIENT
Start: 2019-08-29 | End: 2019-08-29 | Stop reason: HOSPADM

## 2019-08-29 RX ORDER — PROPOFOL 10 MG/ML
INJECTION, EMULSION INTRAVENOUS PRN
Status: DISCONTINUED | OUTPATIENT
Start: 2019-08-29 | End: 2019-08-29

## 2019-08-29 RX ORDER — ONDANSETRON 4 MG/1
4 TABLET, ORALLY DISINTEGRATING ORAL EVERY 30 MIN PRN
Status: DISCONTINUED | OUTPATIENT
Start: 2019-08-29 | End: 2019-08-29 | Stop reason: HOSPADM

## 2019-08-29 RX ORDER — SODIUM CHLORIDE, SODIUM LACTATE, POTASSIUM CHLORIDE, CALCIUM CHLORIDE 600; 310; 30; 20 MG/100ML; MG/100ML; MG/100ML; MG/100ML
INJECTION, SOLUTION INTRAVENOUS CONTINUOUS PRN
Status: DISCONTINUED | OUTPATIENT
Start: 2019-08-29 | End: 2019-08-29

## 2019-08-29 RX ADMIN — PROPOFOL 30 MG: 10 INJECTION, EMULSION INTRAVENOUS at 10:08

## 2019-08-29 RX ADMIN — LIDOCAINE HYDROCHLORIDE 40 MG: 20 INJECTION, SOLUTION INFILTRATION; PERINEURAL at 10:07

## 2019-08-29 RX ADMIN — SODIUM CHLORIDE, POTASSIUM CHLORIDE, SODIUM LACTATE AND CALCIUM CHLORIDE: 600; 310; 30; 20 INJECTION, SOLUTION INTRAVENOUS at 08:58

## 2019-08-29 RX ADMIN — PROPOFOL 30 MG: 10 INJECTION, EMULSION INTRAVENOUS at 10:12

## 2019-08-29 RX ADMIN — PROPOFOL 30 MG: 10 INJECTION, EMULSION INTRAVENOUS at 10:14

## 2019-08-29 RX ADMIN — PROPOFOL 30 MG: 10 INJECTION, EMULSION INTRAVENOUS at 10:10

## 2019-08-29 RX ADMIN — PROPOFOL 50 MG: 10 INJECTION, EMULSION INTRAVENOUS at 10:07

## 2019-08-29 ASSESSMENT — MIFFLIN-ST. JEOR: SCORE: 1247.93

## 2019-08-29 ASSESSMENT — COPD QUESTIONNAIRES: CAT_SEVERITY: SEVERE

## 2019-08-29 NOTE — OP NOTE
Gail Perry MRN# [id number]   YOB: 1940 Age: 79 year old      Date of Admission:  8/29/2019    Primary care provider: Skyler Victoria    PREOPERATIVE DIAGNOSIS:  Dysphagia       POSTOPERATIVE DIAGNOSES: Inflamed hypopharynx, tortuous esophagus, small hiatal hernia, retained food in stomach.        PROCEDURE:  Esophagogastroduodenoscopy with cold forceps biopsies.       HISTORY OF PRESENT ILLNESS:  See clinic note      OPERATIVE FINDINGS:  The gastroesophageal junction was noted 35cm from the incisors.  The Z line was regular inflamed hypopharynx, tortuous esophagus, small hiatal hernia, retained food in stomach. No strictures or ulcers.     Specimen(s) submitted to pathology:  Antral biopsies, Random esophageal.     PROCEDURE:  With the patient in the supine position on the transport cart, IV sedation was administered by the nurse anesthetist.  Her correct identity and planned procedure were confirmed during a requisite timeout pause.  A bite block was placed and the fiberoptic endoscope was introduced and negotiated through the cricopharyngeus without difficulty.  The length of the esophagus was examined and was tortuous.  The gastroesophageal junction was noted 35 cm from the incisors; the Z line was regular without ulceration, bleeding source or stricture.  There was no  evidence of Rowley's change.  The stomach was entered and the pylorus was traversed easily.  The gastric mucosa was mildly inflamed.; there was no evidence of gastric polyp, ulcer or bleeding source.  The duodenum was similarly without finding.  The endoscope was returned to the body of the stomach and retroflex confirmed the absence of abnormality in the cardia. There was noted to be retained food in the cardia of the stomach and within the hiatal hernia.       Random cold forceps biopsies were obtained of the antrum for H. pylori.  Hemostasis was judged adequate on visualization.   The endoscope was returned to the  GE junction.  A second circumferential examination of the esophagus was performed on slow withdrawal and random esophageal biopsies were taken to rule out eosinophilic esophagitis. The procedure was satisfactorially tolerated; there was no appreciable blood loss and the patient was returned to Day Surgery in good condition.      Pepito Burnett MD  8/29/2019  10:22 AM

## 2019-08-29 NOTE — DISCHARGE INSTRUCTIONS
UPPER ENDOSCOPY    AFTER THE PROCEDURE    You will return to Same Day Surgery to rest for about an hour before you go home.    The doctor will talk with you and your family.    A family member/friend may visit with you.    You may burp up any air remaining in your stomach.    You may feel dizzy or light-headed from the medicine.    Your nurse will go over the discharge instructions with you and your caregiver and answer any of your questions.      You will be contacted the next day to check on how you are doing.    If biopsies were taken, you will be contacted with the results usually within 3 days.  BACK AT HOME    Rest for an hour or two after you get home.    When your throat is no longer numb and you have a gag reflex, take a few sips of cool water.  If you can swallow comfortably, you may start eating again.    You may have a mild sore throat for the rest of the day.    You will be contacted with results by the surgeons office within a week. If not contacted in one week, call the surgeons office.  WHAT TO WATCH FOR:  Problems rarely occur after the exam, but it is important to be aware of the early signs of a complication.  Call your doctor immediately if you have:    Difficulty swallowing or breathing    Unusual pain in your stomach or chest    Vomiting blood or dark material that looks like coffee grounds    Black or tarry stools    Temperature over 101.5 degrees    MORE QUESTIONS?  Please ask your doctor or nurse before the exam begins  or call your doctor at the clinic.    IF YOU MUST CANCEL YOUR PROCEDURE THE EVENING/NIGHT BEFORE, PLEASE CALL HOSPITAL ADMITTING -[phone] OR TOLL FREE [phone], EXT. [phone].    Phone Numbers:  [facility] - [phone] [phone]  [facility] - [phone]  Surgery Patient Education - [phone] or toll free [phone]    Post-Anesthesia Patient Instructions    IMMEDIATELY FOLLOWING SURGERY:  Do not drive or operate machinery for the first twenty  four hours after surgery.  Do not make any important decisions for twenty four hours after surgery or while taking narcotic pain medications or sedatives.  If you develop intractable nausea and vomiting or a severe headache please notify your doctor immediately.    FOLLOW-UP:  Please make an appointment with your surgeon as instructed. You do not need to follow up with anesthesia unless specifically instructed to do so.    WOUND CARE INSTRUCTIONS (if applicable):  Keep a dry clean dressing on the anesthesia/puncture wound site if there is drainage.  Once the wound has quit draining you may leave it open to air.  Generally you should leave the bandage intact for twenty four hours unless there is drainage.  If the epidural site drains for more than 36-48 hours please call the anesthesia department.    QUESTIONS?:  Please feel free to call your physician or the hospital  if you have any questions, and they will be happy to assist you.

## 2019-08-29 NOTE — ANESTHESIA CARE TRANSFER NOTE
Patient: Gail Perry    Procedure(s):  UPPER ENDOSCOPY WITH BIOPSY    Diagnosis: ESOPHAGEAL DYSPHAGIA  Diagnosis Additional Information: No value filed.    Anesthesia Type:   MAC     Note:    Patient transferred to:Phase II  Handoff Report: Identifed the Patient, Identified the Reponsible Provider, Reviewed the pertinent medical history, Discussed the surgical course, Reviewed Intra-OP anesthesia mangement and issues during anesthesia, Set expectations for post-procedure period and Allowed opportunity for questions and acknowledgement of understanding      Vitals: (Last set prior to Anesthesia Care Transfer)    CRNA VITALS  8/29/2019 0949 - 8/29/2019 1022      8/29/2019             Resp Rate (set):  8                Electronically Signed By: MORA Styles CRNA  August 29, 2019  10:22 AM

## 2019-08-29 NOTE — ANESTHESIA CARE TRANSFER NOTE
Patient: Gail Perry    Procedure(s):  UPPER ENDOSCOPY WITH BIOPSY    Diagnosis: ESOPHAGEAL DYSPHAGIA  Diagnosis Additional Information: No value filed.    Anesthesia Type:   MAC     Note:  Airway :Nasal Cannula  Patient transferred to:Phase II  Handoff Report: Identifed the Patient, Identified the Reponsible Provider, Reviewed the pertinent medical history, Discussed the surgical course, Reviewed Intra-OP anesthesia mangement and issues during anesthesia, Set expectations for post-procedure period and Allowed opportunity for questions and acknowledgement of understanding      Vitals: (Last set prior to Anesthesia Care Transfer)    CRNA VITALS  8/29/2019 0949 - 8/29/2019 1023      8/29/2019             Resp Rate (set):  8                Electronically Signed By: MORA Styles CRNA  August 29, 2019  10:23 AM

## 2019-09-03 LAB — COPATH REPORT: NORMAL

## 2019-09-05 DIAGNOSIS — R13.19 ESOPHAGEAL DYSPHAGIA: Primary | ICD-10-CM

## 2021-02-01 ENCOUNTER — TRANSFERRED RECORDS (OUTPATIENT)
Dept: UROLOGY | Facility: OTHER | Age: 81
End: 2021-02-01

## 2021-02-04 ENCOUNTER — OFFICE VISIT (OUTPATIENT)
Dept: UROLOGY | Facility: OTHER | Age: 81
End: 2021-02-04
Attending: UROLOGY
Payer: MEDICARE

## 2021-02-04 VITALS
DIASTOLIC BLOOD PRESSURE: 120 MMHG | BODY MASS INDEX: 35.82 KG/M2 | SYSTOLIC BLOOD PRESSURE: 168 MMHG | HEART RATE: 72 BPM | WEIGHT: 189.6 LBS | RESPIRATION RATE: 18 BRPM

## 2021-02-04 DIAGNOSIS — E66.01 MORBID OBESITY (H): ICD-10-CM

## 2021-02-04 DIAGNOSIS — N39.3 STRESS INCONTINENCE: ICD-10-CM

## 2021-02-04 DIAGNOSIS — N39.0 ACUTE URINARY TRACT INFECTION: ICD-10-CM

## 2021-02-04 DIAGNOSIS — N32.81 OVERACTIVE BLADDER: Primary | ICD-10-CM

## 2021-02-04 DIAGNOSIS — N39.41 URGE INCONTINENCE: ICD-10-CM

## 2021-02-04 LAB
ALBUMIN UR-MCNC: 10 MG/DL
APPEARANCE UR: ABNORMAL
BACTERIA #/AREA URNS HPF: ABNORMAL /HPF
BILIRUB UR QL STRIP: NEGATIVE
COLOR UR AUTO: YELLOW
GLUCOSE UR STRIP-MCNC: NEGATIVE MG/DL
HGB UR QL STRIP: NEGATIVE
KETONES UR STRIP-MCNC: NEGATIVE MG/DL
LEUKOCYTE ESTERASE UR QL STRIP: ABNORMAL
MUCOUS THREADS #/AREA URNS LPF: PRESENT /LPF
NITRATE UR QL: POSITIVE
PH UR STRIP: 6 PH (ref 5–7)
RBC #/AREA URNS AUTO: 2 /HPF (ref 0–2)
SOURCE: ABNORMAL
SP GR UR STRIP: 1.02 (ref 1–1.03)
SQUAMOUS #/AREA URNS AUTO: 7 /HPF (ref 0–1)
UROBILINOGEN UR STRIP-MCNC: 2 MG/DL (ref 0–2)
WBC #/AREA URNS AUTO: >182 /HPF (ref 0–5)
WBC CLUMPS #/AREA URNS HPF: PRESENT /HPF
YEAST #/AREA URNS HPF: ABNORMAL /HPF

## 2021-02-04 PROCEDURE — 87088 URINE BACTERIA CULTURE: CPT | Mod: ZL | Performed by: UROLOGY

## 2021-02-04 PROCEDURE — 81001 URINALYSIS AUTO W/SCOPE: CPT | Mod: ZL | Performed by: UROLOGY

## 2021-02-04 PROCEDURE — 99204 OFFICE O/P NEW MOD 45 MIN: CPT | Performed by: UROLOGY

## 2021-02-04 PROCEDURE — 51798 US URINE CAPACITY MEASURE: CPT | Performed by: UROLOGY

## 2021-02-04 PROCEDURE — G0463 HOSPITAL OUTPT CLINIC VISIT: HCPCS | Mod: 25

## 2021-02-04 PROCEDURE — 87086 URINE CULTURE/COLONY COUNT: CPT | Mod: ZL | Performed by: UROLOGY

## 2021-02-04 RX ORDER — CEFUROXIME AXETIL 500 MG/1
500 TABLET ORAL 2 TIMES DAILY
Qty: 14 TABLET | Refills: 0 | Status: SHIPPED | OUTPATIENT
Start: 2021-02-04 | End: 2021-04-01

## 2021-02-04 ASSESSMENT — PAIN SCALES - GENERAL: PAINLEVEL: SEVERE PAIN (7)

## 2021-02-04 NOTE — NURSING NOTE
Review of Systems:    Weight loss:    No     Recent fever/chills:  No   Night sweats:   No  Current skin rash:  Yes   Recent hair loss:  No  Heat intolerance:  No   Cold intolerance:  Yes  Chest pain:   No   Palpitations:   No  Shortness of breath:  Yes   Wheezing:   No  Constipation:    No   Diarrhea:   No   Nausea:   No   Vomiting:   No   Kidney/side pain:  Yes   Back pain:   No  Frequent headaches:  No   Dizziness:     Yes  Leg swelling:   Yes   Calf pain:    No

## 2021-02-04 NOTE — PROGRESS NOTES
PCP:  Dr Victoria    Type of Visit  NPV    Chief Complaint  Incontinence    HPI  Ms. Perry is a 81 year old female who presents with incontinence.  She underwent cystocele repair x 2 (1989 and 1991) and mid-urethral sling placement in 2017.    Patient leaks urine about several times a day.  Volume of incontinence is described as large.  Overall, leaking urine interferes (bother score) with daily living approximately 8/10.  Patient uses 6-7 pads per day.  Onset was 10 years ago.  Patient denies: dysuria and gross hematuria.    Over the years she has taken numerous anticholinergic medications.  Currently she is taking Detrol twice daily with no benefit.  Previous to this she took oxybutynin twice daily for well over a year.  She has also tried other anticholinergics but cannot recall the names of them at this time.  She estimates that she has tried at least 4-5 anticholinergic medications over the last 5 years.  All with disappointing results and also leading to side effects without significant perceived benefit.    Leakage of urine occurs with urgency? Yes  Leakage of urine occurs with valsalva? Yes  Leakage of urine occurs without sensation? Yes      Past Medical History  She  has a past medical history of Age-related macular degeneration, Anemia, Anesthesia of skin, Bunion of great toe of left foot, Chronic obstructive pulmonary disease (H), Essential (primary) hypertension, Frequency of micturition, Gastro-esophageal reflux disease without esophagitis, Hereditary and idiopathic neuropathy, Herpesviral infection, Hyperlipidemia, Hypothyroidism, Kidney failure, Low back pain, Major depressive disorder, single episode, Melanocytic nevi of scalp and neck, Methicillin resistant Staphylococcus aureus infection, Nontoxic single thyroid nodule, Osteoarthritis, Other specified disorders of bone density and structure, unspecified site (CODE), Peripheral vascular disease (H), Peripheral vascular disease (H), Personal  history of other medical treatment (CODE), and Puckering of right macula.  Patient Active Problem List   Diagnosis     Chronic ulcer of left foot limited to breakdown of skin (H)     Idiopathic neuropathy     PVD (peripheral vascular disease) (H)     Stress incontinence     Thyroid activity decreased     Abnormal ECG     Arthropathy     COPD (chronic obstructive pulmonary disease) (H)     Depression     Gastroesophageal reflux disease     Hammer toe of left foot     Primary osteoarthritis of right knee     S/P foot surgery, left     Status post total right knee replacement     Hypertension     Hypothyroidism     Thyroid disease       Past Surgical History  She  has a past surgical history that includes Bunionectomy; other surgical history; other surgical history; Appendectomy open; Hysterectomy total abdominal, bilateral salpingo-oophorectomy, combined; Colonoscopy (02/01/2010); other surgical history; Arthroscopy knee; Cholecystectomy; Esophagoscopy, gastroscopy, duodenoscopy (EGD), combined; other surgical history; other surgical history; other surgical history; and Esophagoscopy, gastroscopy, duodenoscopy (EGD), combined (N/A, 8/29/2019).    Medications  She has a current medication list which includes the following prescription(s): acetaminophen, amitriptyline, amlodipine besylate, gabapentin, hydrocodone-acetaminophen, levothyroxine, oxybutynin, pantoprazole, paroxetine, and pregabalin.    Allergies  Allergies   Allergen Reactions     Latex Hives     Other reaction(s): Angioedema  Affected around mouth while at dental office (when latex gloves used)  Other reaction(s): Angioedema  Affected around mouth while at dental office (when latex gloves used)  Rash around mouth     Nickel Unknown     rash     Penicillins Hives     Sulfa Drugs Other (See Comments)     Yellow around eyes; ? Liver issues       Social History  She  reports that she has never smoked. She has never used smokeless tobacco. She reports current  alcohol use.  No drug abuse.    Family History  Family History   Problem Relation Age of Onset     Heart Disease Mother         Heart Disease     Hypertension Father         Hypertension     Other - See Comments Father         Pulmonary Emboli     Other - See Comments Daughter         No Known Problems     Breast Cancer Sister         Cancer-breast     Other - See Comments Sister         adopted sister     Breast Cancer Daughter         Cancer-breast,total mastectomy Age 36     Other - See Comments Daughter         OP cerebellar atrophy       Review of Systems  I personally reviewed the ROS with the patient.    Nursing Notes:   Kannan Adams RN  2/4/2021 10:10 AM  Signed  Review of Systems:    Weight loss:    No     Recent fever/chills:  No   Night sweats:   No  Current skin rash:  Yes   Recent hair loss:  No  Heat intolerance:  No   Cold intolerance:  Yes  Chest pain:   No   Palpitations:   No  Shortness of breath:  Yes   Wheezing:   No  Constipation:    No   Diarrhea:   No   Nausea:   No   Vomiting:   No   Kidney/side pain:  Yes   Back pain:   No  Frequent headaches:  No   Dizziness:     Yes  Leg swelling:   Yes   Calf pain:    No          Kannan Adams RN  2/4/2021 10:10 AM  Signed  Post-Void Residual  A post-void residual was measured by ultrasonic bladder scanner.  29 mL        Physical Exam  Vitals:    02/04/21 0956 02/04/21 1003   BP: (!) 162/120 (!) 168/120   BP Location: Left arm Left arm   Patient Position: Sitting Sitting   Cuff Size: Adult Regular Adult Regular   Pulse: 72    Resp: 18    Weight: 86 kg (189 lb 9.6 oz)      Constitutional: NAD, WDWN  Head: NCAT  Eyes: Conjunctivae normal  Cardiovascular: Regular rate  Pulmonary/Chest: Respirations are even and non-labored bilaterally  Abdominal: Soft with no distension, tenderness, masses, guarding or CVA tenderness  Neurological: A + O x 3,  cranial nerves II-XII grossly intact  Extremities: YUMI x 4, warm, no clubbing, no cyanosis  Skin: Pink, warm,  dry with no rash  Psychiatric:  Normal mood and affect  Genitourinary: Nonpalpable bladder    Labs  Results for orders placed or performed in visit on 02/04/21   UA reflex to Microscopic     Status: Abnormal   Result Value Ref Range    Color Urine Yellow     Appearance Urine Slightly Cloudy     Glucose Urine Negative NEG^Negative mg/dL    Bilirubin Urine Negative NEG^Negative    Ketones Urine Negative NEG^Negative mg/dL    Specific Gravity Urine 1.017 1.003 - 1.035    Blood Urine Negative NEG^Negative    pH Urine 6.0 5.0 - 7.0 pH    Protein Albumin Urine 10 (A) NEG^Negative mg/dL    Urobilinogen mg/dL 2.0 0.0 - 2.0 mg/dL    Nitrite Urine Positive (A) NEG^Negative    Leukocyte Esterase Urine Large (A) NEG^Negative    Source Midstream Urine     RBC Urine 2 0 - 2 /HPF    WBC Urine >182 (H) 0 - 5 /HPF    WBC Clumps Present (A) NEG^Negative /HPF    Bacteria Urine Many (A) NEG^Negative /HPF    Yeast Urine Few (A) NEG^Negative /HPF    Squamous Epithelial /HPF Urine 7 (H) 0 - 1 /HPF    Mucous Urine Present (A) NEG^Negative /LPF     Post-Void Residual  A post-void residual was measured by ultrasonic bladder scanner.  29 mL today    Assessment  Ms. Perry is a 81 year old female with history of mid urethral sling placement in 2017 who presents with clinically predominate urge incontinence.  Given the degree of pyuria today and her symptoms I am recommending a urine culture and 1 week of Ceftin.    We discussed treatment options for irritative voiding symptoms such as urinary urgency and frequency and urge incontinence.    We discussed 3 steps of therapy.  The first step includes conservative measures such as weight loss, timed voiding, avoidance of constipation and avoidance of dietary triggers (elimination diet).  The second step includes medications such as anticholinergics and/or mirabegron.  The third step includes Botox intradetrusor injections, Interstim and/or PTNS therapy.    We discussed the importance of  clinical bother when deciding the approach to treatment.    Patient complains of significant urinary frequency, urgency and urge incontinence.    Prior treatments include attempted behavior modification and anticholinergic medication.  Failure of anticholinergics due to poor efficacy and intolerable side effects.  Treatment options were discussed: Botox injection, posterior tibial nerve stimulation, and Interstim neuromodulation.      Patient elected to proceed with intradetrusor Botox injections.  Discussed risks of UTI and/or urinary retention (6%) requiring self cath or indwelling catheter for a temporary period of time.  The benefit lasts about 6 months on average and I explained the need for retreatment.    Plan  Ceftin 500 mg twice daily x1 week  Urine culture  Intravesical Botox 100 units in the clinic

## 2021-02-06 LAB
BACTERIA SPEC CULT: ABNORMAL
SPECIMEN SOURCE: ABNORMAL

## 2021-02-17 ENCOUNTER — OFFICE VISIT (OUTPATIENT)
Dept: UROLOGY | Facility: OTHER | Age: 81
End: 2021-02-17
Attending: UROLOGY
Payer: MEDICARE

## 2021-02-17 VITALS — WEIGHT: 190.8 LBS | RESPIRATION RATE: 12 BRPM | BODY MASS INDEX: 36.05 KG/M2 | HEART RATE: 84 BPM

## 2021-02-17 DIAGNOSIS — N32.81 OVERACTIVE BLADDER: Primary | ICD-10-CM

## 2021-02-17 PROCEDURE — 52287 CYSTOSCOPY CHEMODENERVATION: CPT | Performed by: UROLOGY

## 2021-02-17 PROCEDURE — G0463 HOSPITAL OUTPT CLINIC VISIT: HCPCS | Mod: 25

## 2021-02-17 PROCEDURE — 250N000020 HC RX IP 250 OP 636 J0585: Performed by: UROLOGY

## 2021-02-17 PROCEDURE — 96372 THER/PROPH/DIAG INJ SC/IM: CPT | Performed by: UROLOGY

## 2021-02-17 RX ADMIN — ONABOTULINUMTOXINA 100 UNITS: 100 INJECTION, POWDER, LYOPHILIZED, FOR SOLUTION INTRADERMAL; INTRAMUSCULAR at 11:50

## 2021-02-17 ASSESSMENT — PAIN SCALES - GENERAL: PAINLEVEL: SEVERE PAIN (6)

## 2021-02-17 NOTE — PROGRESS NOTES
Preprocedure diagnosis  Urge incontinence with hypertonicity of bladder    Postprocedure diagnosis  Urge incontinence with hypertonicity of bladder    Procedure  Cystourethroscopy with intradetrusor injection of Botox, 100 units    Surgeon  Denny Joy MD    Anesthesia  None    Complications  None    Indications  The patient previously failed anticholinergic medication for treatment of the above named indication.  Informed consent was obtained.  We discussed the risks of Botox including, but not limited to, the risk of urinary retention and UTI.  Discussed performing the procedure in the OR versus clinic- risks and benefits.    Findings  Cystoscopy revealed one right and left ureteral orifice in the normal anatomic position, normal bladder mucosa and no tumors, masses or stones.    Procedure  The patient was taken to the procedure room and placed supine on the procedure table.    The patient was positioned in dorsal lithotomy, prepped and draped in a sterile fashion.    A time-out was performed.    The cystoscope was passed through the urethra and into the bladder.    The injection needle was passed through the working port of the cystoscope and 10 units/mL/injection x 10 injections for a total of 100 international units of Botox was injected submucosally.   I injected 4 doses from the left to right lateral wall just above the trigonal ridge.    Two radial injections of 3 doses were then used rising toward the dome.    I encountered minimal bleeding from the sites and avoided injection of the trigone.   Once the injections were completed, the bladder was emptied and the scope was removed.    Plan  Follow up in 4-6 weeks for a PVR in clinic

## 2021-02-17 NOTE — NURSING NOTE
Cefuroxime 500mg tablet (2-250mg tablets with same lot # and expiration date) by mouth one time now ordered by Denny Joy MD.  Medication administered per verbal order   Lot # [id number]  Exp. 12/31/2021  Patient tolerated well.  Marry Qiu RN..................2/17/2021  11:24 AM

## 2021-02-17 NOTE — PATIENT INSTRUCTIONS
Home Care after Botox Treatment  Follow these guidelines for your care after your procedure.    Activity  No limitations    Bathing or showering  No limitations    Symptoms  You may notice some burning with urination but this usually resolves after 1-2 days.  You may also notice small amounts of blood in your urine.  Please increase water intake for the next few days to help with these symptoms.    Contacts  General Questions: [phone]  Appointments:  [phone]  Emergencies:  [phone]    When to call the clinic  If you develop any of the following symptoms please call the clinic immediately.  If the clinic is closed please be seen at an urgent care clinic or the Emergency Department.  - Burning with urination that worsens after 2 days  - Unable to urinate causing severe pelvic pain  - Fevers of greater than 101 degrees F  - Flank pain that is not responding to pain medication    Follow up  If you are currently taking an anticholinergic for urgency (such as oxybutynin, Detrol, Toviaz or Sanctura) please continue for 1 week then stop.  Please follow up in 4-6 weeks for a bladder scan.

## 2021-02-17 NOTE — NURSING NOTE
Botox  Verbal Order per Denny Joy MD Read Back to prep patient.  Perineum area prepped with chlorhexidene Gluconate and patient draped per sterile technique.    Sodium Chloride 0.9%, 10mL mixed with Botox  Lot #: [id number]  Expiration date: 9/1/21  : [employer]  NDC: [id number]    Universal Protocol    A. Pre-procedure verification complete Yes  1-relevant information / documentation available, reviewed and properly matched to the patient; 2-consent accurate and complete, 3-equipment and supplies available    B. Site marking complete N/A  Site marked if not in continuous attendance with patient    C. TIME OUT completed Yes  Time Out was conducted just prior to starting procedure to verify the eight required elements: 1-patient identity, 2-consent accurate and complete, 3-position, 4-correct side/site marked (if applicable), 5-procedure, 6-relevant images / results properly labeled and displayed (if applicable), 7-antibiotics / irrigation fluids (if applicable), 8-safety precautions.    After procedure perineum area rinsed.  Discharge instructions reviewed with patient.  Patient verbalized understanding of discharge instructions and discharged ambulatory.

## 2021-03-18 ENCOUNTER — OFFICE VISIT (OUTPATIENT)
Dept: UROLOGY | Facility: OTHER | Age: 81
End: 2021-03-18
Attending: UROLOGY
Payer: MEDICARE

## 2021-03-18 VITALS
DIASTOLIC BLOOD PRESSURE: 84 MMHG | RESPIRATION RATE: 16 BRPM | WEIGHT: 190 LBS | HEART RATE: 86 BPM | BODY MASS INDEX: 35.9 KG/M2 | SYSTOLIC BLOOD PRESSURE: 128 MMHG

## 2021-03-18 DIAGNOSIS — N39.41 URGE INCONTINENCE: ICD-10-CM

## 2021-03-18 DIAGNOSIS — N32.81 OVERACTIVE BLADDER: Primary | ICD-10-CM

## 2021-03-18 PROCEDURE — 99214 OFFICE O/P EST MOD 30 MIN: CPT | Performed by: UROLOGY

## 2021-03-18 PROCEDURE — G0463 HOSPITAL OUTPT CLINIC VISIT: HCPCS | Mod: 25

## 2021-03-18 PROCEDURE — 51798 US URINE CAPACITY MEASURE: CPT | Performed by: UROLOGY

## 2021-03-18 RX ORDER — CEFAZOLIN SODIUM 2 G/100ML
2 INJECTION, SOLUTION INTRAVENOUS
Status: CANCELLED | OUTPATIENT
Start: 2021-03-18

## 2021-03-18 ASSESSMENT — PAIN SCALES - GENERAL: PAINLEVEL: NO PAIN (0)

## 2021-03-18 NOTE — PROGRESS NOTES
Type of Visit  Established    Chief Complaint  Urgency  Urge incontinence    HPI  Ms. Perry is a 81 year old female with history of urge urinary incontinence s/p Botox 100 units 4 weeks ago performed in clinic.  She reports no improvement since undergoing the Botox treatment.  She did not experience post-op retention or UTIs.  She denies difficulty emptying bladder and weak stream.    She states that her symptoms have not changed or improved at all since Botox.  She continues to use well over 10 heavy pads per day.  When asking her how much this affects her in terms of quality of life she states that she often is brought to tears due to the degree of bother incontinence causes.  She does not engage in social activities as often as she would like due to concerns about ongoing leakage.  She is highly motivated for treatment and is asking about alternative options.    Previous urinary history  She underwent cystocele repair x 2 (1989 and 1991) and mid-urethral sling placement in 2017.  Over the years she has taken numerous anticholinergic medications.  Currently she is taking Detrol twice daily with no benefit.  Previous to this she took oxybutynin twice daily for well over a year.  She has also tried other anticholinergics but cannot recall the names of them at this time.  She estimates that she has tried at least 4-5 anticholinergic medications over the last 5 years.  All with disappointing results and also leading to side effects without significant perceived benefit.    Baseline  symptoms  Patient leaks urine about several times a day.  Volume of incontinence is described as large.  Overall, leaking urine interferes (bother score) with daily living approximately 8/10.  Patient uses 6-7 pads per day.  Onset was 10 years ago.  Patient denies: dysuria and gross hematuria.      Review of Systems  I reviewed the ROS with the patient.    Nursing Notes:   Susan Ferrara LPN  3/18/2021 11:35 AM  Signed  Pt  presents to clinic for follow up after Botox 2/17/21    Review of Systems:    Weight loss:    No     Recent fever/chills:  No   Night sweats:   No  Current skin rash:  No   Recent hair loss:  No  Heat intolerance:  No   Cold intolerance:  No  Chest pain:   No   Palpitations:   No  Shortness of breath:  No   Wheezing:   No  Constipation:    No   Diarrhea:   No   Nausea:   No   Vomiting:   No   Kidney/side pain:  No   Back pain:   No  Frequent headaches:  No   Dizziness:     No  Leg swelling:   No   Calf pain:    No            Physical Exam  Vitals:    03/18/21 1144   BP: 128/84   BP Location: Right arm   Patient Position: Sitting   Cuff Size: Adult Large   Pulse: 86   Resp: 16   Weight: 86.2 kg (190 lb)     Constitutional: NAD, WDWN.  Cardiovascular: Regular rate.  Pulmonary/Chest: Respirations are even and non-labored bilaterally.  Abdominal: Soft. No distension, tenderness, masses or guarding. No CVA tenderness.  Extremities: YUMI x 4, Warm. No clubbing.  No cyanosis.    Skin: Pink, warm and dry.  No rashes noted.    Post-Void Residual  A post-void residual was measured by ultrasonic bladder scanner.  150 mL today    Assessment  Ms. Perry is a 81 year old female s/p Botox 100 units 4 weeks ago  Her symptoms have not improved.  We will abandon Botox treatments as an option.    We discussed her options including observation, Myrbetriq, anticholinergics, increasing dose of Botox, PTNM and InterStim.    Following this discussion she would like to proceed with a PNE trial,    Patient complains of significant urinary frequency, urgency, urge incontinence.    Prior treatments include attempted behavior modification, anticholinergics and Botox.  These efforts attempted for over 1 year have been unsuccessful.  Failure of anticholinergics due to poor efficacy and intolerable side effects.  Treatment options were discussed: Botox injection, posterior tibial nerve stimulation, and Interstim neuromodulation.       Discussed treatment options and she elected to proceed with PNE testing for possible InterStim implant.    Plan  Peripheral nerve evaluation in the OR under MAC.

## 2021-03-18 NOTE — NURSING NOTE
Pt presents to clinic for follow up after Botox 2/17/21    Review of Systems:    Weight loss:    Yes     Recent fever/chills:  No   Night sweats:   No  Current skin rash:  No   Recent hair loss:  No  Heat intolerance:  Yes   Cold intolerance:  Yes  Chest pain:   No   Palpitations:   No  Shortness of breath:  No   Wheezing:   Yes  Constipation:    No   Diarrhea:   No   Nausea:   No   Vomiting:   No   Kidney/side pain:  Yes   Back pain:   Yes  Frequent headaches:  Yes   Dizziness:     Yes  Leg swelling:   Yes   Calf pain:    No    Post-Void Residual  A post-void residual was measured by ultrasonic bladder scanner.  150 mL

## 2021-03-30 RX ORDER — BUPROPION HYDROCHLORIDE 150 MG/1
150 TABLET ORAL EVERY MORNING
COMMUNITY

## 2021-03-30 RX ORDER — FUROSEMIDE 20 MG
20 TABLET ORAL DAILY
COMMUNITY
End: 2021-04-01

## 2021-03-30 RX ORDER — NYSTATIN 100000 [USP'U]/G
POWDER TOPICAL 2 TIMES DAILY
COMMUNITY

## 2021-03-30 RX ORDER — ALBUTEROL SULFATE 0.83 MG/ML
2.5 SOLUTION RESPIRATORY (INHALATION) EVERY 4 HOURS PRN
COMMUNITY

## 2021-03-30 RX ORDER — ASPIRIN 81 MG
100 TABLET, DELAYED RELEASE (ENTERIC COATED) ORAL DAILY
Status: ON HOLD | COMMUNITY
End: 2021-04-27

## 2021-03-30 RX ORDER — FERROUS SULFATE 325(65) MG
325 TABLET, DELAYED RELEASE (ENTERIC COATED) ORAL 3 TIMES DAILY
COMMUNITY

## 2021-03-30 RX ORDER — DIPHENHYDRAMINE HCL 25 MG
25 CAPSULE ORAL DAILY
COMMUNITY

## 2021-03-30 RX ORDER — TOLTERODINE TARTRATE 2 MG/1
2 TABLET, EXTENDED RELEASE ORAL 2 TIMES DAILY
COMMUNITY
End: 2021-04-01

## 2021-03-30 RX ORDER — ATORVASTATIN CALCIUM 20 MG/1
20 TABLET, FILM COATED ORAL DAILY
Status: ON HOLD | COMMUNITY
End: 2021-04-06

## 2021-04-02 ENCOUNTER — ALLIED HEALTH/NURSE VISIT (OUTPATIENT)
Dept: FAMILY MEDICINE | Facility: OTHER | Age: 81
End: 2021-04-02
Attending: UROLOGY
Payer: MEDICARE

## 2021-04-02 DIAGNOSIS — Z20.822 COVID-19 RULED OUT: Primary | ICD-10-CM

## 2021-04-02 LAB
SARS-COV-2 RNA RESP QL NAA+PROBE: NORMAL
SPECIMEN SOURCE: NORMAL

## 2021-04-02 PROCEDURE — C9803 HOPD COVID-19 SPEC COLLECT: HCPCS

## 2021-04-02 PROCEDURE — U0003 INFECTIOUS AGENT DETECTION BY NUCLEIC ACID (DNA OR RNA); SEVERE ACUTE RESPIRATORY SYNDROME CORONAVIRUS 2 (SARS-COV-2) (CORONAVIRUS DISEASE [COVID-19]), AMPLIFIED PROBE TECHNIQUE, MAKING USE OF HIGH THROUGHPUT TECHNOLOGIES AS DESCRIBED BY CMS-2020-01-R: HCPCS | Mod: ZL | Performed by: UROLOGY

## 2021-04-02 PROCEDURE — U0005 INFEC AGEN DETEC AMPLI PROBE: HCPCS | Mod: ZL | Performed by: UROLOGY

## 2021-04-03 LAB
LABORATORY COMMENT REPORT: NORMAL
SARS-COV-2 RNA RESP QL NAA+PROBE: NEGATIVE
SPECIMEN SOURCE: NORMAL

## 2021-04-05 ENCOUNTER — ANESTHESIA EVENT (OUTPATIENT)
Dept: SURGERY | Facility: OTHER | Age: 81
End: 2021-04-05
Payer: MEDICARE

## 2021-04-06 ENCOUNTER — ANESTHESIA (OUTPATIENT)
Dept: SURGERY | Facility: OTHER | Age: 81
End: 2021-04-06
Payer: MEDICARE

## 2021-04-06 ENCOUNTER — HOSPITAL ENCOUNTER (OUTPATIENT)
Facility: OTHER | Age: 81
Discharge: HOME | End: 2021-04-06
Attending: UROLOGY | Admitting: UROLOGY
Payer: MEDICARE

## 2021-04-06 ENCOUNTER — HOSPITAL ENCOUNTER (OUTPATIENT)
Dept: GENERAL RADIOLOGY | Facility: OTHER | Age: 81
End: 2021-04-06
Attending: UROLOGY | Admitting: UROLOGY
Payer: MEDICARE

## 2021-04-06 VITALS
HEART RATE: 76 BPM | RESPIRATION RATE: 12 BRPM | SYSTOLIC BLOOD PRESSURE: 138 MMHG | OXYGEN SATURATION: 94 % | TEMPERATURE: 97.2 F | DIASTOLIC BLOOD PRESSURE: 84 MMHG | WEIGHT: 190 LBS | BODY MASS INDEX: 35.9 KG/M2

## 2021-04-06 DIAGNOSIS — N32.81 OVERACTIVE BLADDER: ICD-10-CM

## 2021-04-06 DIAGNOSIS — N39.41 URGE INCONTINENCE: ICD-10-CM

## 2021-04-06 PROCEDURE — 64561 IMPLANT NEUROELECTRODES: CPT | Performed by: NURSE ANESTHETIST, CERTIFIED REGISTERED

## 2021-04-06 PROCEDURE — 250N000011 HC RX IP 250 OP 636: Performed by: NURSE ANESTHETIST, CERTIFIED REGISTERED

## 2021-04-06 PROCEDURE — C1767 GENERATOR, NEURO NON-RECHARG: HCPCS | Performed by: UROLOGY

## 2021-04-06 PROCEDURE — 99100 ANES PT EXTEME AGE<1 YR&>70: CPT | Performed by: NURSE ANESTHETIST, CERTIFIED REGISTERED

## 2021-04-06 PROCEDURE — 370N000017 HC ANESTHESIA TECHNICAL FEE, PER MIN: Performed by: UROLOGY

## 2021-04-06 PROCEDURE — 999N000141 HC STATISTIC PRE-PROCEDURE NURSING ASSESSMENT: Performed by: UROLOGY

## 2021-04-06 PROCEDURE — 710N000012 HC RECOVERY PHASE 2, PER MINUTE: Performed by: UROLOGY

## 2021-04-06 PROCEDURE — 999N000180 XR SURGERY CARM FLUORO LESS THAN 5 MIN: Mod: TC

## 2021-04-06 PROCEDURE — C1778 LEAD, NEUROSTIMULATOR: HCPCS | Performed by: UROLOGY

## 2021-04-06 PROCEDURE — C1897 LEAD, NEUROSTIM TEST KIT: HCPCS | Performed by: UROLOGY

## 2021-04-06 PROCEDURE — 250N000009 HC RX 250: Performed by: UROLOGY

## 2021-04-06 PROCEDURE — 258N000003 HC RX IP 258 OP 636: Performed by: NURSE ANESTHETIST, CERTIFIED REGISTERED

## 2021-04-06 PROCEDURE — 250N000011 HC RX IP 250 OP 636: Performed by: UROLOGY

## 2021-04-06 PROCEDURE — 250N000009 HC RX 250: Performed by: NURSE ANESTHETIST, CERTIFIED REGISTERED

## 2021-04-06 PROCEDURE — 360N000084 HC SURGERY LEVEL 4 W/ FLUORO, PER MIN: Performed by: UROLOGY

## 2021-04-06 PROCEDURE — 64561 IMPLANT NEUROELECTRODES: CPT | Mod: 50 | Performed by: UROLOGY

## 2021-04-06 DEVICE — LEAD 309201 ISTIM TEST STIM EMAN LF45
Type: IMPLANTABLE DEVICE | Site: SACRUM | Status: FUNCTIONAL
Brand: INTERSTIM™

## 2021-04-06 RX ORDER — SODIUM CHLORIDE 9 MG/ML
INJECTION, SOLUTION INTRAVENOUS CONTINUOUS
Status: DISCONTINUED | OUTPATIENT
Start: 2021-04-06 | End: 2021-04-06 | Stop reason: HOSPADM

## 2021-04-06 RX ORDER — LIDOCAINE HYDROCHLORIDE AND EPINEPHRINE 10; 10 MG/ML; UG/ML
INJECTION, SOLUTION INFILTRATION; PERINEURAL PRN
Status: DISCONTINUED | OUTPATIENT
Start: 2021-04-06 | End: 2021-04-06 | Stop reason: HOSPADM

## 2021-04-06 RX ORDER — HYDROMORPHONE HYDROCHLORIDE 1 MG/ML
.3-.5 INJECTION, SOLUTION INTRAMUSCULAR; INTRAVENOUS; SUBCUTANEOUS EVERY 10 MIN PRN
Status: DISCONTINUED | OUTPATIENT
Start: 2021-04-06 | End: 2021-04-06 | Stop reason: HOSPADM

## 2021-04-06 RX ORDER — PROPOFOL 10 MG/ML
INJECTION, EMULSION INTRAVENOUS PRN
Status: DISCONTINUED | OUTPATIENT
Start: 2021-04-06 | End: 2021-04-06

## 2021-04-06 RX ORDER — NALOXONE HYDROCHLORIDE 0.4 MG/ML
0.4 INJECTION, SOLUTION INTRAMUSCULAR; INTRAVENOUS; SUBCUTANEOUS
Status: DISCONTINUED | OUTPATIENT
Start: 2021-04-06 | End: 2021-04-06 | Stop reason: HOSPADM

## 2021-04-06 RX ORDER — NALOXONE HYDROCHLORIDE 0.4 MG/ML
0.2 INJECTION, SOLUTION INTRAMUSCULAR; INTRAVENOUS; SUBCUTANEOUS
Status: DISCONTINUED | OUTPATIENT
Start: 2021-04-06 | End: 2021-04-06 | Stop reason: HOSPADM

## 2021-04-06 RX ORDER — SODIUM CHLORIDE, SODIUM LACTATE, POTASSIUM CHLORIDE, CALCIUM CHLORIDE 600; 310; 30; 20 MG/100ML; MG/100ML; MG/100ML; MG/100ML
INJECTION, SOLUTION INTRAVENOUS CONTINUOUS
Status: DISCONTINUED | OUTPATIENT
Start: 2021-04-06 | End: 2021-04-06 | Stop reason: HOSPADM

## 2021-04-06 RX ORDER — ONDANSETRON 2 MG/ML
4 INJECTION INTRAMUSCULAR; INTRAVENOUS EVERY 30 MIN PRN
Status: DISCONTINUED | OUTPATIENT
Start: 2021-04-06 | End: 2021-04-06 | Stop reason: HOSPADM

## 2021-04-06 RX ORDER — LIDOCAINE HYDROCHLORIDE 20 MG/ML
INJECTION, SOLUTION INFILTRATION; PERINEURAL PRN
Status: DISCONTINUED | OUTPATIENT
Start: 2021-04-06 | End: 2021-04-06

## 2021-04-06 RX ORDER — FENTANYL CITRATE 50 UG/ML
25-50 INJECTION, SOLUTION INTRAMUSCULAR; INTRAVENOUS
Status: DISCONTINUED | OUTPATIENT
Start: 2021-04-06 | End: 2021-04-06 | Stop reason: HOSPADM

## 2021-04-06 RX ORDER — ONDANSETRON 4 MG/1
4 TABLET, ORALLY DISINTEGRATING ORAL EVERY 30 MIN PRN
Status: DISCONTINUED | OUTPATIENT
Start: 2021-04-06 | End: 2021-04-06 | Stop reason: HOSPADM

## 2021-04-06 RX ORDER — PROPOFOL 10 MG/ML
INJECTION, EMULSION INTRAVENOUS CONTINUOUS PRN
Status: DISCONTINUED | OUTPATIENT
Start: 2021-04-06 | End: 2021-04-06

## 2021-04-06 RX ORDER — LIDOCAINE 40 MG/G
CREAM TOPICAL
Status: DISCONTINUED | OUTPATIENT
Start: 2021-04-06 | End: 2021-04-06 | Stop reason: HOSPADM

## 2021-04-06 RX ORDER — CEFAZOLIN SODIUM 2 G/100ML
2 INJECTION, SOLUTION INTRAVENOUS
Status: COMPLETED | OUTPATIENT
Start: 2021-04-06 | End: 2021-04-06

## 2021-04-06 RX ORDER — MEPERIDINE HYDROCHLORIDE 50 MG/ML
12.5 INJECTION INTRAMUSCULAR; INTRAVENOUS; SUBCUTANEOUS
Status: DISCONTINUED | OUTPATIENT
Start: 2021-04-06 | End: 2021-04-06 | Stop reason: HOSPADM

## 2021-04-06 RX ADMIN — SODIUM CHLORIDE: 9 INJECTION, SOLUTION INTRAVENOUS at 08:53

## 2021-04-06 RX ADMIN — CEFAZOLIN SODIUM 2 G: 2 INJECTION, SOLUTION INTRAVENOUS at 09:58

## 2021-04-06 RX ADMIN — PROPOFOL 60 MG: 10 INJECTION, EMULSION INTRAVENOUS at 09:56

## 2021-04-06 RX ADMIN — LIDOCAINE HYDROCHLORIDE 60 MG: 20 INJECTION, SOLUTION INFILTRATION; PERINEURAL at 09:56

## 2021-04-06 RX ADMIN — PROPOFOL 40 MG: 10 INJECTION, EMULSION INTRAVENOUS at 09:59

## 2021-04-06 RX ADMIN — PROPOFOL 120 MCG/KG/MIN: 10 INJECTION, EMULSION INTRAVENOUS at 09:57

## 2021-04-06 ASSESSMENT — COPD QUESTIONNAIRES
COPD: 1
CAT_SEVERITY: MILD

## 2021-04-06 NOTE — DISCHARGE INSTRUCTIONS
[facility]  Adult Discharge Orders & Instructions    ________________________________________________________________          For 12 hours after surgery  1. Get plenty of rest.  A responsible adult must stay with you for at least 12 hours after you leave the hospital.   2. You may feel lightheaded.  IF so, sit for a few minutes before standing.  Have someone help you get up.   3. You may have a slight fever. Call the doctor if your fever is over 101 F (38.3 C) (taken under the tongue) or lasts longer than 24 hours.  4. You may have a dry mouth, a sore throat, muscle aches or trouble sleeping.  These should go away after 24 hours.  5. Do not make important or legal decisions.  6.   Do not drive or use heavy equipment.  If you have weakness or tingling, don't drive or use heavy equipment until this feeling goes away.    To contact a doctor, call   [phone]_______________________

## 2021-04-06 NOTE — OR NURSING
The patient was unable or refused to remove jewelry prior to their surgical procedure. The patient has been instructed on the risk of injury and possible infection. In the event jewelry or piercings are obstructing the surgical process or impeding circulation, the jewelry or piercings may need to be cut off. The surgeon and anesthesia provider have been notified that an item cannot be removed, or that the patient refuses to remove the jewelry or piercing. The surgeon and anesthesia provider will determine if it is in the patient's best interest to proceed with the procedure with the jewelry or piercings remaining in contact with the patient's body.

## 2021-04-06 NOTE — OP NOTE
"Preprocedure diagnosis  Urge Incontinence    Postprocedure diagnosis  Urge Incontinece    Procedure  Peripheral nerve evaluation with fluoroscopically guided lead placement, bilateral    Surgeon  Denny Joy MD    Surgeon(s)/Proceduralist(s) and Assistants (if any)  Surgeon(s):  Denny Joy MD  Circulator: Vish Meyers RN  X-Ray Technologist: Jennifer Cox  Assist Patient Positioning: Leyda Singh RN  2nd Scrub: Johan Joya    Specimen(s)  No    (EBL) Estimated blood loss (ml)  0    Anesthesia  Sedation    Complications  None    Indications  The patient previously failed anticholinergic medication for treatment of the above named indication.    Informed consent was obtained.      Findings  Left and right leads well placed in S3 foramen bilaterally based on symptoms during stimulation testing intra-op as well as fluoroscopic evaluation    Procedure  The patient was taken to the operating room and placed prone on the operating table.  Pre-operative antibiotics were administered.  Bilateral lower extremity SCDs were placed.  Induction of anesthesia was performed followed by a time-out was performed.    Lidocaine 2% with epinephrine was instilled subcutaneously near the S3 foramen.  Using fluoroscopic guidance and sacral bony landmarks the 3.5\" foramen needle was placed on the right.  Testing revealed toe curl and a sensation of the labia.  Fluoroscopy was used to confirm appropriate position.  The left side was placed in a similar fashion.  The PNE lead was placed in the left foramen needle after removing the trocar.  Testing again revealed appropriate position.  The lead stylet was removed and the foramen needle was removed in a push-pull technique assuring the lead remained in the same position.  A-P fluoro revealed the leads remained in the same position prior to needle removal.  Tegaderm was used to secure the leads to the skin.  The pulse generator/battery was attached to the leads.  The " case was completed at this point.    The patient tolerated the procedure well and was taken to the recovery room.    Plan  Follow up Thursday for removal of temporary leads

## 2021-04-06 NOTE — ANESTHESIA PREPROCEDURE EVALUATION
Anesthesia Pre-Procedure Evaluation    Patient: Gail Perry   MRN: [id number] : 1940        Preoperative Diagnosis: Overactive bladder [N32.81]  Urge incontinence [N39.41]   Procedure : Procedure(s):  Bilateral peripheral nerve evaluation     Past Medical History:   Diagnosis Date     Age-related macular degeneration     OD     Anemia     No Comments Provided     Anesthesia of skin     hx difficulty awakening from surgery many years ago ()- no problem with recent surgeries     Bunion of great toe of left foot     surgery for right     Chronic obstructive pulmonary disease (H)     mild     Essential (primary) hypertension     No Comments Provided     Frequency of micturition     ,urinary incontinence     Gastro-esophageal reflux disease without esophagitis     gastritis     Hereditary and idiopathic neuropathy     ideopathic- chronic pain contract- appts every other month; feet and legs     Herpesviral infection     cold sores - none for years     Hyperlipidemia     No Comments Provided     Hypothyroidism     No Comments Provided     Kidney failure     No Comments Provided     Low back pain     No Comments Provided     Major depressive disorder, single episode     No Comments Provided     Melanocytic nevi of scalp and neck     right neck     Methicillin resistant Staphylococcus aureus infection     2011,toe L foot ulcerations     Nontoxic single thyroid nodule     noted on US 2012, repeat 2012 and stable- no f/u recommended     Osteoarthritis     ,right femoral head, resection     Other specified disorders of bone density and structure, unspecified site (CODE)     No Comments Provided     Peripheral vascular disease (H)     in bilateral legs     Peripheral vascular disease (H)     No Comments Provided     Personal history of other medical treatment (CODE)     ,with hysterectomy     Puckering of right macula     No Comments Provided      Past Surgical History:   Procedure  Laterality Date     APPENDECTOMY OPEN      No Comments Provided     ARTHROSCOPY KNEE      2008     BUNIONECTOMY      10/06,Right foot- [facility]     CHOLECYSTECTOMY      9/2010,[facility]ra     COLONOSCOPY  02/01/2010 2/05, 2010     ESOPHAGOSCOPY, GASTROSCOPY, DUODENOSCOPY (EGD), COMBINED      9/13/2012,Dr. Main Bullock- for GI bleed     ESOPHAGOSCOPY, GASTROSCOPY, DUODENOSCOPY (EGD), COMBINED N/A 8/29/2019    Procedure: UPPER ENDOSCOPY WITH BIOPSY;  Surgeon: Pepito Burnett MD;  Location: [facility]     HYSTERECTOMY TOTAL ABDOMINAL, BILATERAL SALPINGO-OOPHORECTOMY, COMBINED      1980,bleeding- with hx of blood transfusion     OTHER SURGICAL HISTORY      5/7/2012,[id number],OTHER,from osteoarthrosis     OTHER SURGICAL HISTORY      9/14/2012,[id number],BIOPSY, gastric mild reactive gastropathy, neg for H-pylori     OTHER SURGICAL HISTORY      4/2001,[id number],PREMALIG/BENIGN SKIN LESION EXCISION,excision nevus right neck     OTHER SURGICAL HISTORY      05/07/2012,[id number],TOTAL HIP ARTHROPLASTY,Right     OTHER SURGICAL HISTORY      08/20/2015,[id number],TOE AMPUTATION,Left,Left foot lesser toe - [place], 2/24/2016 - Dr Yee     OTHER SURGICAL HISTORY      SUR5,ANTERIOR AND POSTERIOR VAGINAL REPAIR,x2; done in [facility]      Allergies   Allergen Reactions     Latex Hives     Other reaction(s): Angioedema  Affected around mouth while at dental office (when latex gloves used)  Other reaction(s): Angioedema  Affected around mouth while at dental office (when latex gloves used)  Rash around mouth     Nickel Unknown     rash     Penicillins Hives     Sulfa Drugs Other (See Comments)     Yellow around eyes; ? Liver issues      Social History     Tobacco Use     Smoking status: Never Smoker     Smokeless tobacco: Never Used   Substance Use Topics     Alcohol use: Yes     Comment: Alcoholic Drinks/day: once a year      Wt Readings from Last 1 Encounters:   04/06/21 86.2 kg (190 lb)        Anesthesia Evaluation   Pt has had prior anesthetic.      No history of anesthetic complications       ROS/MED HX  ENT/Pulmonary: Comment: Breathing is baseline today. No recent inhaler use.     (+) Intermittent, asthma Treatment: Inhaler prn,  mild,  COPD,     Neurologic:     (+) peripheral neuropathy, - Bilateral feet and hands..     Cardiovascular:     (+) Dyslipidemia hypertension-Peripheral Vascular Disease----    METS/Exercise Tolerance: 3 - Able to walk 1-2 blocks without stopping    Hematologic:  - neg hematologic  ROS     Musculoskeletal:  - neg musculoskeletal ROS     GI/Hepatic:     (+) GERD, Asymptomatic on medication,     Renal/Genitourinary:     (+) renal disease, type: CRI,     Endo:     (+) thyroid problem, Obesity,     Psychiatric/Substance Use:     (+) psychiatric history depression H/O chronic opiod use .     Infectious Disease:  - neg infectious disease ROS     Malignancy:  - neg malignancy ROS     Other:  - neg other ROS          Physical Exam    Airway        Mallampati: III   TM distance: > 3 FB   Neck ROM: full   Mouth opening: > 3 cm    Respiratory Devices and Support         Dental       (+) upper dentures      Cardiovascular   cardiovascular exam normal          Pulmonary           breath sounds clear to auscultation   (+) decreased breath sounds           OUTSIDE LABS:  CBC:   Lab Results   Component Value Date    HGB 10.5 (L) 10/02/2017    HCT 33.0 10/02/2017     10/02/2017     BMP:   Lab Results   Component Value Date     10/02/2017    POTASSIUM 5.3 06/19/2019    POTASSIUM 4.2 10/02/2017    CHLORIDE 107 10/02/2017    CO2 26 10/02/2017    BUN 26 (H) 10/02/2017    CR 1.7 (H) 06/19/2019    CR 1.45 (H) 10/02/2017     (H) 06/19/2019    GLC 93 10/02/2017     COAGS: No results found for: PTT, INR, FIBR  POC: No results found for: BGM, HCG, HCGS  HEPATIC: No results found for: ALBUMIN, PROTTOTAL, ALT, AST, GGT, ALKPHOS, BILITOTAL, BILIDIRECT, VIRGIL  OTHER:   Lab Results   Component Value Date    CAMRYN 9.3 10/02/2017       Anesthesia  Plan    ASA Status:  3   NPO Status:  NPO Appropriate    Anesthesia Type: MAC.   Induction: Intravenous.   Maintenance: Balanced.        Consents    Anesthesia Plan(s) and associated risks, benefits, and realistic alternatives discussed. Questions answered and patient/representative(s) expressed understanding.     - Discussed with:  Patient      - Extended Intubation/Ventilatory Support Discussed: No.      - Patient is DNR/DNI Status: No    Use of blood products discussed: No .     Postoperative Care    Pain management: IV analgesics, Multi-modal analgesia.   PONV prophylaxis: Ondansetron (or other 5HT-3)     Comments:    Risks, benefits and alternatives discussed and would like to proceed. General anesthesia ok if indicated.               MORA Sorto CRNA

## 2021-04-06 NOTE — ANESTHESIA CARE TRANSFER NOTE
Patient: Gail Perry    Procedure(s):  Bilateral peripheral nerve evaluation    Diagnosis: Overactive bladder [N32.81]  Urge incontinence [N39.41]  Diagnosis Additional Information: No value filed.    Anesthesia Type:   MAC     Note:      Level of Consciousness: awake  Oxygen Supplementation: nasal cannula  Level of Supplemental Oxygen (L/min / FiO2): 3  Independent Airway: airway patency satisfactory and stable  Dentition: dentition unchanged  Vital Signs Stable: post-procedure vital signs reviewed and stable  Report to RN Given: handoff report given  Patient transferred to: Phase II    Handoff Report: Identifed the Patient, Identified the Reponsible Provider, Reviewed the pertinent medical history, Discussed the surgical course, Reviewed Intra-OP anesthesia mangement and issues during anesthesia, Set expectations for post-procedure period and Allowed opportunity for questions and acknowledgement of understanding      Vitals: (Last set prior to Anesthesia Care Transfer)  CRNA VITALS  4/6/2021 0947 - 4/6/2021 1020      4/6/2021             Resp Rate (set):  10        Electronically Signed By: MORA GUTIÉRREZ CRNA  April 6, 2021  10:20 AM

## 2021-04-06 NOTE — ANESTHESIA POSTPROCEDURE EVALUATION
Patient: Gail Perry    Procedure(s):  Bilateral peripheral nerve evaluation    Diagnosis:Overactive bladder [N32.81]  Urge incontinence [N39.41]  Diagnosis Additional Information: No value filed.    Anesthesia Type:  MAC    Note:  Disposition: Outpatient   Postop Pain Control: Uneventful            Sign Out: Well controlled pain   PONV: No   Neuro/Psych: Uneventful            Sign Out: Acceptable/Baseline neuro status   Airway/Respiratory: Uneventful            Sign Out: Acceptable/Baseline resp. status   CV/Hemodynamics: Uneventful            Sign Out: Acceptable CV status   Other NRE: NONE   DID A NON-ROUTINE EVENT OCCUR? No         Last vitals:  Vitals:    04/06/21 1030 04/06/21 1045 04/06/21 1100   BP: 130/75 138/78 138/84   Pulse: 71 71 76   Resp:      Temp:      SpO2: 95% 94% 94%       Last vitals prior to Anesthesia Care Transfer:  CRNA VITALS  4/6/2021 0947 - 4/6/2021 1047      4/6/2021             Resp Rate (set):  10          Electronically Signed By: MORA Becker CRNA  April 6, 2021  12:12 PM

## 2021-04-08 ENCOUNTER — OFFICE VISIT (OUTPATIENT)
Dept: UROLOGY | Facility: OTHER | Age: 81
End: 2021-04-08
Attending: UROLOGY
Payer: MEDICARE

## 2021-04-08 VITALS
HEART RATE: 78 BPM | RESPIRATION RATE: 20 BRPM | WEIGHT: 190 LBS | DIASTOLIC BLOOD PRESSURE: 88 MMHG | BODY MASS INDEX: 35.9 KG/M2 | SYSTOLIC BLOOD PRESSURE: 134 MMHG

## 2021-04-08 DIAGNOSIS — N32.81 OVERACTIVE BLADDER: Primary | ICD-10-CM

## 2021-04-08 PROCEDURE — 99024 POSTOP FOLLOW-UP VISIT: CPT | Performed by: UROLOGY

## 2021-04-08 PROCEDURE — G0463 HOSPITAL OUTPT CLINIC VISIT: HCPCS

## 2021-04-08 ASSESSMENT — PAIN SCALES - GENERAL: PAINLEVEL: SEVERE PAIN (6)

## 2021-04-08 NOTE — PROGRESS NOTES
Patient presents 2 days after undergoing PNE.  Her results are mixed at this point and we would like to extend the trial.  She will follow-up on Monday with a voiding log/diary to discuss symptoms.

## 2021-04-08 NOTE — NURSING NOTE
Patient presents for post-op today.    Review of Systems:    Weight loss:    No     Recent fever/chills:  No   Night sweats:   No  Current skin rash:  No   Recent hair loss:  No  Heat intolerance:  Yes   Cold intolerance:  Yes  Chest pain:   No   Palpitations:   No  Shortness of breath:  Yes   Wheezing:   Yes  Constipation:    Yes   Diarrhea:   No   Nausea:   No   Vomiting:   No   Kidney/side pain:  No   Back pain:   No  Frequent headaches:  No   Dizziness:     No  Leg swelling:   Yes   Calf pain:    No

## 2021-04-12 ENCOUNTER — OFFICE VISIT (OUTPATIENT)
Dept: UROLOGY | Facility: OTHER | Age: 81
End: 2021-04-12
Attending: UROLOGY
Payer: MEDICARE

## 2021-04-12 VITALS
WEIGHT: 190 LBS | RESPIRATION RATE: 18 BRPM | BODY MASS INDEX: 35.9 KG/M2 | SYSTOLIC BLOOD PRESSURE: 136 MMHG | DIASTOLIC BLOOD PRESSURE: 88 MMHG | HEART RATE: 83 BPM

## 2021-04-12 DIAGNOSIS — N39.41 URGE INCONTINENCE: Primary | ICD-10-CM

## 2021-04-12 PROCEDURE — 99024 POSTOP FOLLOW-UP VISIT: CPT | Performed by: UROLOGY

## 2021-04-12 PROCEDURE — G0463 HOSPITAL OUTPT CLINIC VISIT: HCPCS | Mod: 25

## 2021-04-12 ASSESSMENT — PAIN SCALES - GENERAL: PAINLEVEL: SEVERE PAIN (7)

## 2021-04-12 NOTE — PROGRESS NOTES
Type of Visit  Established    Chief Complaint  Urge incontinence    HPI  Ms. Perry is a 81 year old female s/p PNE trial for urge incontinence.  Patient has been undergoing a PNE trial for the past week.  She follows up today with a voiding diary reflecting significant improvement in objective findings as well as subjective endorsement of significant improvement.    She endorses a 60-65% resolution of symptoms with PNE.  She makes it to the bathroom >80% of the time with PNE versus rarely prior.  She used about 6-7 pads per day as baseline versus 2-3 pads per day during PNE which reflects a significant improvement.    Overall, leaking urine interferes (bother score) with daily living approximately 3-4/10 during PNE.  Previously it was 8/10.      Previous urinary history  She underwent cystocele repair x 2 (1989 and 1991) and mid-urethral sling placement in 2017.  Over the years she has taken numerous anticholinergic medications.  Currently she is taking Detrol twice daily with no benefit.  Previous to this she took oxybutynin twice daily for well over a year.  She has also tried other anticholinergics but cannot recall the names of them at this time.  She estimates that she has tried at least 4-5 anticholinergic medications over the last 5 years.  All with disappointing results and also leading to side effects without significant perceived benefit.      Review of Systems  I reviewed the ROS with the patient.    Nursing Notes:   Susan Ferrara LPN  4/12/2021 12:09 PM  Addendum  Pt presents to clinic for a post op lead pull    Review of Systems:    Weight loss:    No     Recent fever/chills:  No   Night sweats:   No  Current skin rash:  No   Recent hair loss:  No  Heat intolerance:  Yes   Cold intolerance:  Yes  Chest pain:   No   Palpitations:   No  Shortness of breath:  Yes   Wheezing:   Yes  Constipation:    No   Diarrhea:   No   Nausea:   No   Vomiting:   No   Kidney/side pain:  No   Back  "pain:   No  Frequent headaches:  No   Dizziness:     Yes  Leg swelling:   Yes   Calf pain:    No        Physical Exam  Vitals:    04/12/21 1210   BP: 136/88   BP Location: Right arm   Patient Position: Sitting   Cuff Size: Adult Regular   Pulse: 83   Resp: 18   Weight: 86.2 kg (190 lb)     Constitutional: NAD, WDWN.  Cardiovascular: Regular rate.  Pulmonary/Chest: Respirations are even and non-labored bilaterally.  Abdominal: Soft. No distension, tenderness, masses or guarding. No CVA tenderness.  Extremities: YUMI x 4, Warm. No clubbing.  No cyanosis.    Skin: Pink, warm and dry.  No rashes noted.    Post-Void Residual  A post-void residual was measured by ultrasonic bladder scanner.  150 mL (previously recorded)    Assessment  Ms. Perry is a 81 year old female s/p successful PNE trial.    The patient recently underwent PNE evaluation and based on the voiding diary the patient reports >50% objective and subjective improvement in urge incontinence.  I counseled the patient on proceeding to implantation of the pulse gererator (InterStim Implant).    The patient understands the battery will need to be replaced after 4-7 years depending on settings.  Also discussed risks of pain at the site, lead migration, infection requiring removal of the device and need for revision.  The patient also understands that future MRIs can only be safely performed of the head.    Plan  \"InterStim Implant\" in the OR under MAC   -She prefers the nonrechargeable option    "

## 2021-04-12 NOTE — NURSING NOTE
Pt presents to clinic for a post op lead pull    Review of Systems:    Weight loss:    No     Recent fever/chills:  No   Night sweats:   No  Current skin rash:  No   Recent hair loss:  No  Heat intolerance:  Yes   Cold intolerance:  Yes  Chest pain:   No   Palpitations:   No  Shortness of breath:  Yes   Wheezing:   Yes  Constipation:    No   Diarrhea:   No   Nausea:   No   Vomiting:   No   Kidney/side pain:  No   Back pain:   No  Frequent headaches:  No   Dizziness:     Yes  Leg swelling:   Yes   Calf pain:    No

## 2021-04-23 ENCOUNTER — ALLIED HEALTH/NURSE VISIT (OUTPATIENT)
Dept: FAMILY MEDICINE | Facility: OTHER | Age: 81
End: 2021-04-23
Attending: UROLOGY
Payer: MEDICARE

## 2021-04-23 DIAGNOSIS — Z20.822 COVID-19 RULED OUT: Primary | ICD-10-CM

## 2021-04-23 LAB
SARS-COV-2 RNA RESP QL NAA+PROBE: NORMAL
SPECIMEN SOURCE: NORMAL

## 2021-04-23 PROCEDURE — U0003 INFECTIOUS AGENT DETECTION BY NUCLEIC ACID (DNA OR RNA); SEVERE ACUTE RESPIRATORY SYNDROME CORONAVIRUS 2 (SARS-COV-2) (CORONAVIRUS DISEASE [COVID-19]), AMPLIFIED PROBE TECHNIQUE, MAKING USE OF HIGH THROUGHPUT TECHNOLOGIES AS DESCRIBED BY CMS-2020-01-R: HCPCS | Mod: ZL | Performed by: UROLOGY

## 2021-04-23 PROCEDURE — C9803 HOPD COVID-19 SPEC COLLECT: HCPCS

## 2021-04-23 PROCEDURE — U0005 INFEC AGEN DETEC AMPLI PROBE: HCPCS | Mod: ZL | Performed by: UROLOGY

## 2021-04-26 ENCOUNTER — ANESTHESIA EVENT (OUTPATIENT)
Dept: SURGERY | Facility: OTHER | Age: 81
End: 2021-04-26
Payer: MEDICARE

## 2021-04-27 ENCOUNTER — HOSPITAL ENCOUNTER (OUTPATIENT)
Facility: OTHER | Age: 81
Discharge: HOME | End: 2021-04-27
Attending: UROLOGY | Admitting: UROLOGY
Payer: MEDICARE

## 2021-04-27 ENCOUNTER — ANESTHESIA (OUTPATIENT)
Dept: SURGERY | Facility: OTHER | Age: 81
End: 2021-04-27
Payer: MEDICARE

## 2021-04-27 ENCOUNTER — HOSPITAL ENCOUNTER (OUTPATIENT)
Dept: GENERAL RADIOLOGY | Facility: OTHER | Age: 81
End: 2021-04-27
Attending: UROLOGY | Admitting: UROLOGY
Payer: MEDICARE

## 2021-04-27 VITALS
SYSTOLIC BLOOD PRESSURE: 142 MMHG | TEMPERATURE: 97.3 F | WEIGHT: 190 LBS | RESPIRATION RATE: 16 BRPM | OXYGEN SATURATION: 96 % | HEART RATE: 80 BPM | BODY MASS INDEX: 35.9 KG/M2 | DIASTOLIC BLOOD PRESSURE: 95 MMHG

## 2021-04-27 DIAGNOSIS — N39.41 URGE INCONTINENCE: ICD-10-CM

## 2021-04-27 PROCEDURE — 999N000180 XR SURGERY CARM FLUORO LESS THAN 5 MIN: Mod: TC

## 2021-04-27 PROCEDURE — 250N000011 HC RX IP 250 OP 636: Performed by: UROLOGY

## 2021-04-27 PROCEDURE — C1767 GENERATOR, NEURO NON-RECHARG: HCPCS | Performed by: UROLOGY

## 2021-04-27 PROCEDURE — 710N000012 HC RECOVERY PHASE 2, PER MINUTE: Performed by: UROLOGY

## 2021-04-27 PROCEDURE — 64590 INS/RPL PRPH SAC/GSTR NPG/R: CPT | Performed by: NURSE ANESTHETIST, CERTIFIED REGISTERED

## 2021-04-27 PROCEDURE — C1883 ADAPT/EXT, PACING/NEURO LEAD: HCPCS | Performed by: UROLOGY

## 2021-04-27 PROCEDURE — 999N000141 HC STATISTIC PRE-PROCEDURE NURSING ASSESSMENT: Performed by: UROLOGY

## 2021-04-27 PROCEDURE — 250N000013 HC RX MED GY IP 250 OP 250 PS 637: Mod: GY | Performed by: NURSE ANESTHETIST, CERTIFIED REGISTERED

## 2021-04-27 PROCEDURE — 64590 INS/RPL PRPH SAC/GSTR NPG/R: CPT | Performed by: UROLOGY

## 2021-04-27 PROCEDURE — 76000 FLUOROSCOPY <1 HR PHYS/QHP: CPT | Mod: 26 | Performed by: UROLOGY

## 2021-04-27 PROCEDURE — 258N000003 HC RX IP 258 OP 636: Performed by: NURSE ANESTHETIST, CERTIFIED REGISTERED

## 2021-04-27 PROCEDURE — C1787 PATIENT PROGR, NEUROSTIM: HCPCS | Performed by: UROLOGY

## 2021-04-27 PROCEDURE — 360N000084 HC SURGERY LEVEL 4 W/ FLUORO, PER MIN: Performed by: UROLOGY

## 2021-04-27 PROCEDURE — 99100 ANES PT EXTEME AGE<1 YR&>70: CPT | Performed by: NURSE ANESTHETIST, CERTIFIED REGISTERED

## 2021-04-27 PROCEDURE — 272N000001 HC OR GENERAL SUPPLY STERILE: Performed by: UROLOGY

## 2021-04-27 PROCEDURE — 250N000009 HC RX 250: Performed by: NURSE ANESTHETIST, CERTIFIED REGISTERED

## 2021-04-27 PROCEDURE — 250N000009 HC RX 250: Performed by: UROLOGY

## 2021-04-27 PROCEDURE — 370N000017 HC ANESTHESIA TECHNICAL FEE, PER MIN: Performed by: UROLOGY

## 2021-04-27 PROCEDURE — 64581 OPN IMPLTJ NEA SACRAL NERVE: CPT | Performed by: UROLOGY

## 2021-04-27 PROCEDURE — C1897 LEAD, NEUROSTIM TEST KIT: HCPCS | Performed by: UROLOGY

## 2021-04-27 PROCEDURE — 250N000011 HC RX IP 250 OP 636: Performed by: NURSE ANESTHETIST, CERTIFIED REGISTERED

## 2021-04-27 DEVICE — STIMULATOR NEURO INTER-STIM II 3058: Type: IMPLANTABLE DEVICE | Site: BUTTOCKS | Status: FUNCTIONAL

## 2021-04-27 RX ORDER — FENTANYL CITRATE 50 UG/ML
25-50 INJECTION, SOLUTION INTRAMUSCULAR; INTRAVENOUS
Status: DISCONTINUED | OUTPATIENT
Start: 2021-04-27 | End: 2021-04-27 | Stop reason: HOSPADM

## 2021-04-27 RX ORDER — SODIUM CHLORIDE 9 MG/ML
INJECTION, SOLUTION INTRAVENOUS CONTINUOUS
Status: DISCONTINUED | OUTPATIENT
Start: 2021-04-27 | End: 2021-04-27 | Stop reason: HOSPADM

## 2021-04-27 RX ORDER — NALOXONE HYDROCHLORIDE 0.4 MG/ML
0.4 INJECTION, SOLUTION INTRAMUSCULAR; INTRAVENOUS; SUBCUTANEOUS
Status: DISCONTINUED | OUTPATIENT
Start: 2021-04-27 | End: 2021-04-27 | Stop reason: HOSPADM

## 2021-04-27 RX ORDER — NALOXONE HYDROCHLORIDE 0.4 MG/ML
0.2 INJECTION, SOLUTION INTRAMUSCULAR; INTRAVENOUS; SUBCUTANEOUS
Status: DISCONTINUED | OUTPATIENT
Start: 2021-04-27 | End: 2021-04-27 | Stop reason: HOSPADM

## 2021-04-27 RX ORDER — PIPERACILLIN SODIUM, TAZOBACTAM SODIUM 2; .25 G/10ML; G/10ML
2.25 INJECTION, POWDER, LYOPHILIZED, FOR SOLUTION INTRAVENOUS
Status: COMPLETED | OUTPATIENT
Start: 2021-04-27 | End: 2021-04-27

## 2021-04-27 RX ORDER — LIDOCAINE HYDROCHLORIDE AND EPINEPHRINE 10; 10 MG/ML; UG/ML
INJECTION, SOLUTION INFILTRATION; PERINEURAL PRN
Status: DISCONTINUED | OUTPATIENT
Start: 2021-04-27 | End: 2021-04-27 | Stop reason: HOSPADM

## 2021-04-27 RX ORDER — KETAMINE HYDROCHLORIDE 10 MG/ML
INJECTION INTRAMUSCULAR; INTRAVENOUS PRN
Status: DISCONTINUED | OUTPATIENT
Start: 2021-04-27 | End: 2021-04-27

## 2021-04-27 RX ORDER — HYDROCODONE BITARTRATE AND ACETAMINOPHEN 7.5; 325 MG/15ML; MG/15ML
10 SOLUTION ORAL ONCE
Status: COMPLETED | OUTPATIENT
Start: 2021-04-27 | End: 2021-04-27

## 2021-04-27 RX ORDER — PROPOFOL 10 MG/ML
INJECTION, EMULSION INTRAVENOUS CONTINUOUS PRN
Status: DISCONTINUED | OUTPATIENT
Start: 2021-04-27 | End: 2021-04-27

## 2021-04-27 RX ORDER — HYDROCODONE BITARTRATE AND ACETAMINOPHEN 7.5; 325 MG/1; MG/1
1 TABLET ORAL ONCE
Status: DISCONTINUED | OUTPATIENT
Start: 2021-04-27 | End: 2021-04-27

## 2021-04-27 RX ORDER — GLYCOPYRROLATE 0.2 MG/ML
INJECTION, SOLUTION INTRAMUSCULAR; INTRAVENOUS PRN
Status: DISCONTINUED | OUTPATIENT
Start: 2021-04-27 | End: 2021-04-27

## 2021-04-27 RX ORDER — ONDANSETRON 4 MG/1
4 TABLET, ORALLY DISINTEGRATING ORAL EVERY 30 MIN PRN
Status: DISCONTINUED | OUTPATIENT
Start: 2021-04-27 | End: 2021-04-27 | Stop reason: HOSPADM

## 2021-04-27 RX ORDER — ONDANSETRON 2 MG/ML
INJECTION INTRAMUSCULAR; INTRAVENOUS PRN
Status: DISCONTINUED | OUTPATIENT
Start: 2021-04-27 | End: 2021-04-27

## 2021-04-27 RX ORDER — SODIUM CHLORIDE 9 MG/ML
INJECTION, SOLUTION INTRAVENOUS CONTINUOUS PRN
Status: DISCONTINUED | OUTPATIENT
Start: 2021-04-27 | End: 2021-04-27

## 2021-04-27 RX ORDER — ONDANSETRON 2 MG/ML
4 INJECTION INTRAMUSCULAR; INTRAVENOUS EVERY 30 MIN PRN
Status: DISCONTINUED | OUTPATIENT
Start: 2021-04-27 | End: 2021-04-27 | Stop reason: HOSPADM

## 2021-04-27 RX ADMIN — GLYCOPYRROLATE 0.2 MG: 0.2 INJECTION, SOLUTION INTRAMUSCULAR; INTRAVENOUS at 08:44

## 2021-04-27 RX ADMIN — Medication 10 MG: at 09:27

## 2021-04-27 RX ADMIN — SODIUM CHLORIDE: 9 INJECTION, SOLUTION INTRAVENOUS at 08:16

## 2021-04-27 RX ADMIN — HYDROCODONE BITARTRATE AND ACETAMINOPHEN 15 ML: 7.5; 325 SOLUTION ORAL at 10:51

## 2021-04-27 RX ADMIN — Medication 20 MG: at 09:09

## 2021-04-27 RX ADMIN — ONDANSETRON 4 MG: 2 INJECTION INTRAMUSCULAR; INTRAVENOUS at 09:37

## 2021-04-27 RX ADMIN — PIPERACILLIN AND TAZOBACTAM 2.25 G: 2; .25 INJECTION, POWDER, LYOPHILIZED, FOR SOLUTION INTRAVENOUS at 08:56

## 2021-04-27 RX ADMIN — Medication 10 MG: at 08:51

## 2021-04-27 RX ADMIN — SODIUM CHLORIDE: 0.9 INJECTION, SOLUTION INTRAVENOUS at 08:44

## 2021-04-27 RX ADMIN — MIDAZOLAM 2 MG: 1 INJECTION INTRAMUSCULAR; INTRAVENOUS at 08:51

## 2021-04-27 RX ADMIN — PROPOFOL 100 MCG/KG/MIN: 10 INJECTION, EMULSION INTRAVENOUS at 08:51

## 2021-04-27 RX ADMIN — Medication 10 MG: at 09:17

## 2021-04-27 ASSESSMENT — COPD QUESTIONNAIRES: COPD: 1

## 2021-04-27 NOTE — OP NOTE
Preprocedure diagnosis  Urge Incontinence    Postprocedure diagnosis  Urge Incontinece    Procedure  InterStim implant with fluoroscopically guided lead placement, right side  Interstim generator placement    Surgeon  Denny Joy MD    Surgeon(s)/Proceduralist(s) and Assistants (if any)  Surgeon(s):  Denny Joy MD  Circulator: Sara Camp RN  Relief Circulator: Latasha Naranjo RN  Scrub Person: Margarita Dailey  X-Ray Technologist: Jennifer Cox  First Assistant: Lindsey Manuel RN; Leyda Singh RN    Specimen(s)  No    (EBL) Estimated blood loss (ml)  <5    Anesthesia  MAC    Complications  None    Indications  The patient previously failed anticholinergic medication for treatment of the above named indication.   The patient experienced very positive results (objective and subjective) from PNE so we proceeded to Interstim implant.  Informed consent was obtained.      Findings  Electrode 0 Ira then toe curl  Electrode 1 Ira then toe curl  Electrode 2 Ira then toe curl  Electrode 3 Ira then toe curl  Fluoroscopic guidance revealed lead with characteristic appearance in S3 foramen in A-P and lateral view    Procedure  The patient was taken to the operating room and placed prone on the operating table.  Pre-operative antibiotics were administered.  Bilateral lower extremity SCDs were placed.  Induction of anesthesia was performed followed by a time-out was performed.  Patient was prepped and draped and an Ioban was placed.    Lidocaine 2% with epinephrine was instilled subcutaneously near the S3 foramen.  Using fluoroscopic guidance and sacral bony landmarks the foramen needle was placed on the right.  Testing revealed Ira followed by toe curl.  Fluoroscopy was used to confirm appropriate position.  Skin incision was made with a scalpel near the needle.  Stylet was removed and the directional guide was placed.  The introducer was passed over the directional guide.  The dilator was  removed and the lead was placed using fluoroscopic guidance in both the A-P and lateral view.  The 2-3 electrodes straddled the anterior sacrum at the point of hannah deployment.  Testing at this point again resulted in positive responses.  A lateral pocket was made with a scalpel and blunt dissection inferior to the iliac crest.  A tunneling tool was used to pass the lead to the pocket subcutaneously.  The pocket was irrigated with 1 liter of irrigant solution (1 gram Ancef and 1 Liter).  The generator was placed in the pocket with the correct side facing up.  The skin was closed in two layers with 3-0 Vicryl and 4-0 Moncryl.  The impedence was tested and within acceptable limits.  Dermabond was applied.    The patient tolerated the procedure well and was taken to the recovery room.    Plan  Follow up in 2 weeks for wound check

## 2021-04-27 NOTE — DISCHARGE INSTRUCTIONS
[facility]  Adult Discharge Orders & Instructions      For 24 hours after surgery:  1. Get plenty of rest.  A responsible adult must stay with you for at least 24 hours after you leave the hospital.   2. You may feel lightheaded.  IF so, sit for a few minutes before standing.  Have someone help you get up.   3. You may have a slight fever. Call the doctor if your fever is over 101 F (38.3 C) (taken under the tongue) or lasts longer than 24 hours.  4. You may have a dry mouth, a sore throat, muscle aches or trouble sleeping.  These should go away after 24 hours.  5. Do not make important or legal decisions.  6.   Do not drive or use heavy equipment.  If you have weakness or tingling, don't drive or use heavy equipment until this feeling goes away.                                                                                                                                                                         To contact a doctor, call    [phone]______________

## 2021-04-27 NOTE — ANESTHESIA PREPROCEDURE EVALUATION
Anesthesia Pre-Procedure Evaluation    Patient: Gail Perry   MRN: [id number] : 1940        Preoperative Diagnosis: Urge incontinence [N39.41]   Procedure : Procedure(s):  InterStim implant (nonrechargeable)     Past Medical History:   Diagnosis Date     Age-related macular degeneration     OD     Anemia     No Comments Provided     Anesthesia of skin     hx difficulty awakening from surgery many years ago ()- no problem with recent surgeries     Bunion of great toe of left foot     surgery for right     Chronic obstructive pulmonary disease (H)     mild     Essential (primary) hypertension     No Comments Provided     Frequency of micturition     ,urinary incontinence     Gastro-esophageal reflux disease without esophagitis     gastritis     Hereditary and idiopathic neuropathy     ideopathic- chronic pain contract- appts every other month; feet and legs     Herpesviral infection     cold sores - none for years     Hyperlipidemia     No Comments Provided     Hypothyroidism     No Comments Provided     Kidney failure     No Comments Provided     Low back pain     No Comments Provided     Major depressive disorder, single episode     No Comments Provided     Melanocytic nevi of scalp and neck     right neck     Methicillin resistant Staphylococcus aureus infection     2011,toe L foot ulcerations     Nontoxic single thyroid nodule     noted on US 2012, repeat 2012 and stable- no f/u recommended     Osteoarthritis     ,right femoral head, resection     Other specified disorders of bone density and structure, unspecified site (CODE)     No Comments Provided     Peripheral vascular disease (H)     in bilateral legs     Peripheral vascular disease (H)     No Comments Provided     Personal history of other medical treatment (CODE)     ,with hysterectomy     Puckering of right macula     No Comments Provided      Past Surgical History:   Procedure Laterality Date     APPENDECTOMY  OPEN      No Comments Provided     ARTHROSCOPY KNEE      2008     BUNIONECTOMY      10/06,Right foot- Pawel     CHOLECYSTECTOMY      9/2010,Ara     COLONOSCOPY  02/01/2010 2/05, 2010     ESOPHAGOSCOPY, GASTROSCOPY, DUODENOSCOPY (EGD), COMBINED      9/13/2012,Dr. Main Bullock- for GI bleed     ESOPHAGOSCOPY, GASTROSCOPY, DUODENOSCOPY (EGD), COMBINED N/A 8/29/2019    Procedure: UPPER ENDOSCOPY WITH BIOPSY;  Surgeon: Pepito Burnett MD;  Location: [facility] OR     HYSTERECTOMY TOTAL ABDOMINAL, BILATERAL SALPINGO-OOPHORECTOMY, COMBINED      1980,bleeding- with hx of blood transfusion     IMPLANT STIMULATOR SACRAL NERVE PERCUTANEOUS TRIAL Bilateral 4/6/2021    Procedure: Bilateral peripheral nerve evaluation;  Surgeon: Denny Joy MD;  Location: [facility] OR     OTHER SURGICAL HISTORY      5/7/2012,106051,OTHER,from osteoarthrosis     OTHER SURGICAL HISTORY      9/14/2012,[id number],BIOPSY, gastric mild reactive gastropathy, neg for H-pylori     OTHER SURGICAL HISTORY      4/2001,[id number],PREMALIG/BENIGN SKIN LESION EXCISION,excision nevus right neck     OTHER SURGICAL HISTORY      05/07/2012,[id number],TOTAL HIP ARTHROPLASTY,Right     OTHER SURGICAL HISTORY      08/20/2015,[id number],TOE AMPUTATION,Left,Left foot lesser toe - Sylvia, 2/24/2016 - Dr Yee     OTHER SURGICAL HISTORY      SUR5,ANTERIOR AND POSTERIOR VAGINAL REPAIR,x2; done in [facility]      Allergies   Allergen Reactions     Latex Hives     Other reaction(s): Angioedema  Affected around mouth while at dental office (when latex gloves used)  Other reaction(s): Angioedema  Affected around mouth while at dental office (when latex gloves used)  Rash around mouth     Nickel Unknown     rash     Penicillins Hives     Sulfa Drugs Other (See Comments)     Yellow around eyes; ? Liver issues      Social History     Tobacco Use     Smoking status: Never Smoker     Smokeless tobacco: Never Used   Substance Use Topics     Alcohol use: Yes     Comment: Alcoholic Drinks/day: once  a year      Wt Readings from Last 1 Encounters:   04/12/21 86.2 kg (190 lb)        Anesthesia Evaluation   Pt has had prior anesthetic.     No history of anesthetic complications       ROS/MED HX  ENT/Pulmonary:     (+) COPD,     Neurologic: Comment: Macular Degeneration, Hereditary and idiopathic neuropathy- primarily affecting lower extremities       Cardiovascular:     (+) Dyslipidemia hypertension-Peripheral Vascular Disease----GONZALES.     METS/Exercise Tolerance: 1 - Eating, dressing    Hematologic:     (+) anemia,     Musculoskeletal:   (+) arthritis,     GI/Hepatic:     (+) GERD, Asymptomatic on medication,     Renal/Genitourinary:     (+) renal disease, type: CRI, Pt does not require dialysis,     Endo:     (+) thyroid problem, hypothyroidism, Obesity,     Psychiatric/Substance Use:     (+) psychiatric history depression     Infectious Disease:     (+) MRSA,     Malignancy:  - neg malignancy ROS     Other:      (+) , other significant disability Other (comment) (Uses cane due to neuropathy),          Physical Exam    Airway        Mallampati: II   TM distance: > 3 FB   Neck ROM: full   Mouth opening: > 3 cm    Respiratory Devices and Support         Dental       (+) upper dentures      Cardiovascular   cardiovascular exam normal       Rhythm and rate: regular and normal     Pulmonary           (+) decreased breath sounds           OUTSIDE LABS:  CBC:   Lab Results   Component Value Date    HGB 10.5 (L) 10/02/2017    HCT 33.0 10/02/2017     10/02/2017     BMP:   Lab Results   Component Value Date     10/02/2017    POTASSIUM 5.3 06/19/2019    POTASSIUM 4.2 10/02/2017    CHLORIDE 107 10/02/2017    CO2 26 10/02/2017    BUN 26 (H) 10/02/2017    CR 1.7 (H) 06/19/2019    CR 1.45 (H) 10/02/2017     (H) 06/19/2019    GLC 93 10/02/2017     COAGS: No results found for: PTT, INR, FIBR  POC: No results found for: BGM, HCG, HCGS  HEPATIC: No results found for: ALBUMIN, PROTTOTAL, ALT, AST, GGT, ALKPHOS,  BILITOTAL, BILIDIRECT, VIRGIL  OTHER:   Lab Results   Component Value Date    CAMRYN 9.3 10/02/2017       Anesthesia Plan    ASA Status:  3   NPO Status:  NPO Appropriate    Anesthesia Type: MAC.     - Reason for MAC: chronic cardiopulmonary disease, immobility needed, straight local not clinically adequate              Consents    Anesthesia Plan(s) and associated risks, benefits, and realistic alternatives discussed. Questions answered and patient/representative(s) expressed understanding.     - Discussed with:  Patient      - Extended Intubation/Ventilatory Support Discussed: No.      - Patient is DNR/DNI Status: No    Use of blood products discussed: No .     Postoperative Care    Pain management: IV analgesics.        Comments:                MORA Becker CRNA

## 2021-04-27 NOTE — ANESTHESIA CARE TRANSFER NOTE
Patient: Gail Perry    Procedure(s):  InterStim implant (nonrechargeable)    Diagnosis: Urge incontinence [N39.41]  Diagnosis Additional Information: No value filed.    Anesthesia Type:   MAC     Note:      Level of Consciousness: drowsy  Oxygen Supplementation: room air  Level of Supplemental Oxygen (L/min / FiO2): 98%  Independent Airway: airway patency satisfactory and stable  Dentition: dentition unchanged  Vital Signs Stable: post-procedure vital signs reviewed and stable  Report to RN Given: handoff report given  Patient transferred to: Phase II    Handoff Report: Identifed the Patient, Identified the Reponsible Provider, Reviewed the pertinent medical history, Discussed the surgical course, Reviewed Intra-OP anesthesia mangement and issues during anesthesia, Set expectations for post-procedure period and Allowed opportunity for questions and acknowledgement of understanding      Vitals: (Last set prior to Anesthesia Care Transfer)  CRNA VITALS  4/27/2021 0913 - 4/27/2021 0949      4/27/2021             Resp Rate (set):  10        Electronically Signed By: David Kellerman, APRN CRNA  April 27, 2021  9:49 AM

## 2021-04-27 NOTE — PHARMACY
Pharmacy- Renal Dose Adjustment    Patient Active Problem List   Diagnosis     Chronic ulcer of left foot limited to breakdown of skin (H)     Idiopathic neuropathy     PVD (peripheral vascular disease) (H)     Stress incontinence     Thyroid activity decreased     Abnormal ECG     Arthropathy     COPD (chronic obstructive pulmonary disease) (H)     Depression     Gastroesophageal reflux disease     Hammer toe of left foot     Primary osteoarthritis of right knee     S/P foot surgery, left     Status post total right knee replacement     Hypertension     Hypothyroidism     Thyroid disease     Morbid obesity (H)     Overactive bladder     Urge incontinence        Relevant Labs:  Recent Labs   Lab Test 10/02/17  1132   HGB 10.5*         No height entered, using actual body weight, estimated CrCl: ~ 36 ml/min (max depending on height    No intake or output data in the 24 hours ending 04/27/21 0752       Estimated Cr cl is between 20 and 40 ml/min, per Renal Dose Adjustment Protocol, will adjust piperacillin/tazobactam to 2.25 g IVPB x1.    Reviewed allergy to penicillin =hives.  Per nurse she had ampicillin in the past. Pharmacy unable to find this. Cephlosporin listed    Will continue to follow and make adjustments accordingly. Thank You.    Ela Mendoza, [facility] ....................  4/27/2021   7:52 AM

## 2021-05-12 ENCOUNTER — OFFICE VISIT (OUTPATIENT)
Dept: UROLOGY | Facility: OTHER | Age: 81
End: 2021-05-12
Attending: UROLOGY
Payer: MEDICARE

## 2021-05-12 VITALS — HEART RATE: 79 BPM | SYSTOLIC BLOOD PRESSURE: 130 MMHG | DIASTOLIC BLOOD PRESSURE: 78 MMHG

## 2021-05-12 DIAGNOSIS — N39.41 URGE INCONTINENCE: Primary | ICD-10-CM

## 2021-05-12 PROCEDURE — 99024 POSTOP FOLLOW-UP VISIT: CPT | Performed by: UROLOGY

## 2021-05-12 PROCEDURE — G0463 HOSPITAL OUTPT CLINIC VISIT: HCPCS

## 2021-05-12 ASSESSMENT — PAIN SCALES - GENERAL: PAINLEVEL: SEVERE PAIN (6)

## 2021-05-12 NOTE — PROGRESS NOTES
Type of Visit  Established    Chief Complaint  Urge incontinence    HPI  Ms. Perry is a 81 year old female 2 weeks s/p Interstim implant.  In the past 2 weeks the patient has had 1 night with a wet diaper.  Before the InterStim implant nightly wet diapers were a regular occurrence multiple times weekly.  She is extremely satisfied with the treatment.  She understands the programming well.  InterStim is currently set to program 1 setting 2.7 currently.      Review of Systems  I reviewed the ROS with the patient.    Nursing Notes:   Susan Ferrara LPN  5/12/2021  1:39 PM  Signed  Pt presents to clinic for 2 week post up Interstim placement    Review of Systems:    Weight loss:    No     Recent fever/chills:  No   Night sweats:   No  Current skin rash:  No   Recent hair loss:  No  Heat intolerance:  Yes   Cold intolerance:  Yes  Chest pain:   No   Palpitations:   No  Shortness of breath:  Yes   Wheezing:   Yes  Constipation:    No   Diarrhea:   No   Nausea:   No   Vomiting:   No   Kidney/side pain:  Yes   Back pain:   No  Frequent headaches:  Yes   Dizziness:     No  Leg swelling:   Yes   Calf pain:    No            Physical Exam  Vitals:    05/12/21 1323   BP: 130/78   BP Location: Left arm   Patient Position: Sitting   Cuff Size: Adult Large   Pulse: 79   Constitutional: NAD, WDWN.   Cardiovascular: Regular rate.  Pulmonary/Chest: Respirations are even and non-labored bilaterally.  Abdominal: Soft. No distension, tenderness, masses or guarding. No CVA tenderness.  Genitourinary: Nonpalpable bladder.  Extremities: YUMI x 4, Warm. No clubbing.  No cyanosis.    Skin: Pink, warm and dry.  No rashes noted.  Incision: c/d/i    Assessment  Ms. Perry is a 81 year old female 2 weeks s/p Interstim implant.  Doing great with excellent results.    Plan  Follow-up once annually with [facility] for battery testing

## 2021-05-12 NOTE — NURSING NOTE
Pt presents to clinic for 2 week post up Interstim placement    Review of Systems:    Weight loss:    No     Recent fever/chills:  No   Night sweats:   No  Current skin rash:  No   Recent hair loss:  No  Heat intolerance:  Yes   Cold intolerance:  Yes  Chest pain:   No   Palpitations:   No  Shortness of breath:  Yes   Wheezing:   Yes  Constipation:    No   Diarrhea:   No   Nausea:   No   Vomiting:   No   Kidney/side pain:  Yes   Back pain:   No  Frequent headaches:  Yes   Dizziness:     No  Leg swelling:   Yes   Calf pain:    No

## 2022-09-27 ENCOUNTER — ALLIED HEALTH/NURSE VISIT (OUTPATIENT)
Dept: UROLOGY | Facility: OTHER | Age: 82
End: 2022-09-27
Attending: UROLOGY
Payer: MEDICARE

## 2022-09-27 DIAGNOSIS — N32.81 OVERACTIVE BLADDER: Primary | ICD-10-CM

## 2022-09-27 NOTE — NURSING NOTE
Patient presents to clinic for 1 year follow up interstim  Patient had set device to high and was feeling painful stim so we turned down and now are trying new program- p5 at 2.8.   Educated about getting used to stim but no need to turn it up to feel stim. She will get used to the sensation. Discussed bladder stimulation, drinks.  3 diet pepsi/day and a lot of water.   Impedance: good.   .Rose Mary Manuel LPN on 9/27/2022 at 11:16 AM

## 2024-09-10 ENCOUNTER — ALLIED HEALTH/NURSE VISIT (OUTPATIENT)
Dept: UROLOGY | Facility: OTHER | Age: 84
End: 2024-09-10
Attending: UROLOGY
Payer: MEDICARE

## 2024-09-10 DIAGNOSIS — N32.81 OVERACTIVE BLADDER: Primary | ICD-10-CM

## 2024-09-10 PROCEDURE — 99207 PR NO CHARGE LOS: CPT

## 2024-09-10 PROCEDURE — G0463 HOSPITAL OUTPT CLINIC VISIT: HCPCS

## 2024-09-10 NOTE — PROGRESS NOTES
Interstim  Patient was seen today by [employer] representatives for Interstim management and changes to her implanted device.  Patient is in agreement with the plan. She is in need of a replacement device. Schedule will be reviewed to see if she can be scheduled surgically for a replacement. This will give her a newer device that will allow it to be managed in case of MRI in the future. Will update provider.

## 2024-09-18 DIAGNOSIS — N39.41 URGE INCONTINENCE: Primary | ICD-10-CM

## 2024-09-23 DIAGNOSIS — N39.41 URGE INCONTINENCE: Primary | ICD-10-CM

## 2024-10-01 ENCOUNTER — VIRTUAL VISIT (OUTPATIENT)
Dept: UROLOGY | Facility: OTHER | Age: 84
End: 2024-10-01
Attending: UROLOGY
Payer: MEDICARE

## 2024-10-01 ENCOUNTER — LAB (OUTPATIENT)
Dept: LAB | Facility: OTHER | Age: 84
End: 2024-10-01
Attending: UROLOGY
Payer: MEDICARE

## 2024-10-01 VITALS
OXYGEN SATURATION: 98 % | RESPIRATION RATE: 18 BRPM | HEART RATE: 82 BPM | WEIGHT: 170 LBS | HEIGHT: 61 IN | BODY MASS INDEX: 32.1 KG/M2

## 2024-10-01 DIAGNOSIS — R10.9 RIGHT FLANK PAIN: ICD-10-CM

## 2024-10-01 DIAGNOSIS — N39.41 URGE INCONTINENCE: Primary | ICD-10-CM

## 2024-10-01 DIAGNOSIS — N39.41 URGE INCONTINENCE: ICD-10-CM

## 2024-10-01 DIAGNOSIS — N30.00 ACUTE CYSTITIS WITHOUT HEMATURIA: ICD-10-CM

## 2024-10-01 LAB
ALBUMIN UR-MCNC: 30 MG/DL
ANION GAP SERPL CALCULATED.3IONS-SCNC: 10 MMOL/L (ref 7–15)
APPEARANCE UR: ABNORMAL
BILIRUB UR QL STRIP: NEGATIVE
BUN SERPL-MCNC: 19.9 MG/DL (ref 8–23)
CALCIUM SERPL-MCNC: 9.4 MG/DL (ref 8.8–10.4)
CHLORIDE SERPL-SCNC: 108 MMOL/L (ref 98–107)
COLOR UR AUTO: YELLOW
CREAT SERPL-MCNC: 1.35 MG/DL (ref 0.51–0.95)
EGFRCR SERPLBLD CKD-EPI 2021: 39 ML/MIN/1.73M2
GLUCOSE SERPL-MCNC: 91 MG/DL (ref 70–99)
GLUCOSE UR STRIP-MCNC: NEGATIVE MG/DL
HCO3 SERPL-SCNC: 26 MMOL/L (ref 22–29)
HGB UR QL STRIP: NEGATIVE
KETONES UR STRIP-MCNC: NEGATIVE MG/DL
LEUKOCYTE ESTERASE UR QL STRIP: ABNORMAL
NITRATE UR QL: POSITIVE
PH UR STRIP: 5.5 [PH] (ref 5–9)
POTASSIUM SERPL-SCNC: 4 MMOL/L (ref 3.4–5.3)
SODIUM SERPL-SCNC: 144 MMOL/L (ref 135–145)
SP GR UR STRIP: 1.02 (ref 1–1.03)
UROBILINOGEN UR STRIP-MCNC: 2 MG/DL

## 2024-10-01 PROCEDURE — 51798 US URINE CAPACITY MEASURE: CPT | Mod: GT,95 | Performed by: UROLOGY

## 2024-10-01 PROCEDURE — 87186 SC STD MICRODIL/AGAR DIL: CPT | Mod: ZL

## 2024-10-01 PROCEDURE — 80048 BASIC METABOLIC PNL TOTAL CA: CPT | Mod: ZL

## 2024-10-01 PROCEDURE — 81003 URINALYSIS AUTO W/O SCOPE: CPT | Mod: ZL

## 2024-10-01 PROCEDURE — G0463 HOSPITAL OUTPT CLINIC VISIT: HCPCS | Mod: 25,GT

## 2024-10-01 PROCEDURE — 36415 COLL VENOUS BLD VENIPUNCTURE: CPT | Mod: ZL

## 2024-10-01 PROCEDURE — 99203 OFFICE O/P NEW LOW 30 MIN: CPT | Mod: 95 | Performed by: UROLOGY

## 2024-10-01 RX ORDER — DOCUSATE SODIUM 50 MG AND SENNOSIDES 8.6 MG 8.6; 5 MG/1; MG/1
1 TABLET, FILM COATED ORAL
COMMUNITY
Start: 2024-07-11

## 2024-10-01 ASSESSMENT — PAIN SCALES - GENERAL: PAINLEVEL: MODERATE PAIN (4)

## 2024-10-01 NOTE — H&P (VIEW-ONLY)
Type of service:  Video Visit   Video Visit Start Time: 1:52  Video Visit End Time: 2:22    Originating Location (pt. Location): [facility]  Distant Location (provider location): [place], [place]  Platform used for Video Visit: Epic Virtual Visit Platform    I have reviewed the note as documented above.  This accurately captures the substance of my virtual visit with the patient. The patient states an understanding and is agreeable with the plan.   Yunior Mensah MD   Urology     It was my pleasure to meet Ms. Gail Perry, a 84 year old year old female seen in consultation for Chief Complaint: Follow Up (Discuss interstim )  .  Urge incontinence    HPI: Ms. Gail Perry is a 84 year old year old female with a history of urge incontinence status post InterStim in April 2021 with Dr. Joy who presents today October 1, 2024 virtually for continued evaluation of her overactive bladder.    Last seen by Dr. Joy in 2021.  Recently seen by our [employer] representative for InterStim management.  She was found to have a nonfunctioning unit and will need a replacement device placed.  This will allow MRI imaging as well.    Device has not been working well for her up.  Some days she is leaking without warning consistent with urge.  No caffeine.     Denies burning or UTI symptoms.  Complains of pain in her right flank area, mild ache, dull in nature, 4/10 and intermittent.      No blood in urine.  No history of kidney stones.    Past Medical History:   Diagnosis Date    Age-related macular degeneration     OD    Anemia     No Comments Provided    Anesthesia of skin     hx difficulty awakening from surgery many years ago (1980)- no problem with recent surgeries    Bunion of great toe of left foot     surgery for right    Chronic obstructive pulmonary disease (H)     mild    Essential (primary) hypertension     No Comments Provided    Frequency of micturition     2006,urinary  incontinence    Gastro-esophageal reflux disease without esophagitis     gastritis    Hereditary and idiopathic neuropathy     ideopathic- chronic pain contract- appts every other month; feet and legs    Herpesviral infection     cold sores - none for years    Hyperlipidemia     No Comments Provided    Hypothyroidism     No Comments Provided    Kidney failure     No Comments Provided    Low back pain     No Comments Provided    Major depressive disorder, single episode     No Comments Provided    Melanocytic nevi of scalp and neck     right neck    Methicillin resistant Staphylococcus aureus infection     8/2011,toe L foot ulcerations    Nontoxic single thyroid nodule     noted on US 2/2012, repeat 11/2012 and stable- no f/u recommended    Osteoarthritis     2012,right femoral head, resection    Other specified disorders of bone density and structure, unspecified site (CODE)     No Comments Provided    Peripheral vascular disease (H)     in bilateral legs    Peripheral vascular disease (H)     No Comments Provided    Personal history of other medical treatment (CODE)     1980,with hysterectomy    Puckering of right macula     No Comments Provided       Past Surgical History:   Procedure Laterality Date    APPENDECTOMY OPEN      No Comments Provided    ARTHROSCOPY KNEE      2008    BUNIONECTOMY      10/06,Right foot- Pawel    CHOLECYSTECTOMY      9/2010,Ara    COLONOSCOPY  02/01/2010 2/05, 2010    ESOPHAGOSCOPY, GASTROSCOPY, DUODENOSCOPY (EGD), COMBINED      9/13/2012,Dr. Main Bullock- for GI bleed    ESOPHAGOSCOPY, GASTROSCOPY, DUODENOSCOPY (EGD), COMBINED N/A 8/29/2019    Procedure: UPPER ENDOSCOPY WITH BIOPSY;  Surgeon: Pepito Burnett MD;  Location: [facility]    HYSTERECTOMY TOTAL ABDOMINAL, BILATERAL SALPINGO-OOPHORECTOMY, COMBINED      1980,bleeding- with hx of blood transfusion    IMPLANT STIMULATOR AND LEADS SACRAL NERVE (STAGE ONE AND TWO) Right 4/27/2021    Procedure: InterStim implant  (nonrechargeable);  Surgeon: Denny Joy MD;  Location: [facility] OR    IMPLANT STIMULATOR SACRAL NERVE PERCUTANEOUS TRIAL Bilateral 4/6/2021    Procedure: Bilateral peripheral nerve evaluation;  Surgeon: Denny Joy MD;  Location: [facility] OR    OTHER SURGICAL HISTORY      5/7/2012,[id number],OTHER,from osteoarthrosis    OTHER SURGICAL HISTORY      9/14/2012,[id number],BIOPSY, gastric mild reactive gastropathy, neg for H-pylori    OTHER SURGICAL HISTORY      4/2001,[id number],PREMALIG/BENIGN SKIN LESION EXCISION,excision nevus right neck    OTHER SURGICAL HISTORY      05/07/2012,[id number],TOTAL HIP ARTHROPLASTY,Right    OTHER SURGICAL HISTORY      08/20/2015,[id number],TOE AMPUTATION,Left,Left foot lesser toe - Gwendolyn, 2/24/2016 - Dr Yee    OTHER SURGICAL HISTORY      SUR5,ANTERIOR AND POSTERIOR VAGINAL REPAIR,x2; done in [facility]       FAMILY HISTORY: Noncontributory    SOCIAL HISTORY:   reports that she has never smoked. She has never used smokeless tobacco.    Current Outpatient Medications   Medication Sig Dispense Refill    acetaminophen (TYLENOL) 325 MG tablet Take 650 mg by mouth every 4 hours as needed       albuterol (PROVENTIL) (2.5 MG/3ML) 0.083% neb solution Take 2.5 mg by nebulization every 4 hours as needed for shortness of breath / dyspnea or wheezing      amitriptyline (ELAVIL) 50 MG tablet Take by mouth At Bedtime      AMLODIPINE BESYLATE PO Take 5 mg by mouth every morning       buPROPion (WELLBUTRIN XL) 150 MG 24 hr tablet Take 150 mg by mouth every morning      diclofenac (VOLTAREN) 1 % topical gel Apply topically 4 times daily      diphenhydrAMINE (BENADRYL) 25 MG capsule Take 25 mg by mouth daily.      ferrous sulfate (FE TABS) 325 (65 Fe) MG EC tablet Take 325 mg by mouth 3 times daily      HYDROcodone-acetaminophen (NORCO) 7.5-325 MG per tablet Take 1 tablet by mouth every 6 hours as needed for moderate to severe pain or severe pain      levothyroxine (SYNTHROID/LEVOTHROID) 100 MCG tablet Take 100 mcg by mouth  "daily       naloxone (NARCAN) 4 MG/0.1ML nasal spray Spray 4 mg into one nostril alternating nostrils once as needed for opioid reversal every 2-3 minutes until assistance arrives      nystatin (MYCOSTATIN) 339707 UNIT/GM external powder Apply topically 2 times daily      oxybutynin (DITROPAN) 5 MG tablet Take by mouth 2 times daily      pantoprazole (PROTONIX) 20 MG EC tablet Take 40 mg by mouth daily      PARoxetine (PAXIL) 40 MG tablet Take 40 mg by mouth every morning       STIMULANT LAXATIVE 8.6-50 MG tablet Take 1 tablet by mouth 2 times daily.         ALLERGIES: Latex, Nickel, Penicillins, and Sulfa antibiotics      GENERAL PHYSICAL EXAM:   Vitals: Pulse 82   Resp 18   Ht 1.549 m (5' 1\")   Wt 77.1 kg (170 lb)   SpO2 98%   BMI 32.12 kg/m    Body mass index is 32.12 kg/m .    GENERAL: Well groomed, well developed, well nourished female in NAD.  HEAD: Normocephalic.   NECK: Neck supple.   ENT: No jaundice   RESPIRATORY: Normal respiratory effort.    MS: visibly moving all four   NEURO: Alert and oriented x 3.  PSYCH: Normal mood and affect, pleasant and agreeable during interview and exam.     PVR: Residual urine by ultrasound was 0 ml.          LABS: The last test results for Ms. Gail Perry were reviewed.   Results for orders placed or performed in visit on 10/01/24 (from the past 24 hour(s))   Basic metabolic panel   Result Value Ref Range    Sodium 144 135 - 145 mmol/L    Potassium 4.0 3.4 - 5.3 mmol/L    Chloride 108 (H) 98 - 107 mmol/L    Carbon Dioxide (CO2) 26 22 - 29 mmol/L    Anion Gap 10 7 - 15 mmol/L    Urea Nitrogen 19.9 8.0 - 23.0 mg/dL    Creatinine 1.35 (H) 0.51 - 0.95 mg/dL    GFR Estimate 39 (L) >60 mL/min/1.73m2    Calcium 9.4 8.8 - 10.4 mg/dL    Glucose 91 70 - 99 mg/dL   Urinalysis Macroscopic   Result Value Ref Range    Color Urine Yellow Colorless, Straw, Light Yellow, Yellow    Appearance Urine Slightly Cloudy (A) Clear    Glucose Urine Negative Negative mg/dL    Bilirubin " Urine Negative Negative    Ketones Urine Negative Negative mg/dL    Specific Gravity Urine 1.022 1.000 - 1.030    Blood Urine Negative Negative    pH Urine 5.5 5.0 - 9.0    Protein Albumin Urine 30 (A) Negative mg/dL    Urobilinogen Urine 2.0 Normal, 2.0 mg/dL    Nitrite Urine Positive (A) Negative    Leukocyte Esterase Urine Large (A) Negative       BMP -   Recent Labs   Lab Test 10/01/24  1256 06/19/19  0000 10/02/17  1207     --  142   POTASSIUM 4.0 5.3 4.2   CHLORIDE 108*  --  107   CO2 26  --  26   BUN 19.9  --  26*   CR 1.35* 1.7* 1.45*   GLC 91 114* 93   CAMRYN 9.4  --  9.3       CBC -   Recent Labs   Lab Test 10/02/17  1132   HGB 10.5*          ASSESSMENT:   Urge incontinence  History of InterStim implantation April 2021  Acute cystitis without hematuria  Right flank pain    PLAN:   Patient with a history of urge incontinence status post InterStim.  Device is not working at this time and that reflects in her treatment and symptom control which has been nonexistent.    Some concern over urinary tract infection.  We will culture her urine nonetheless and treat this in anticipation of upcoming surgery.    We spoke about fluid consumption and the fact that she is putting herself at risk for infection given her poor fluid consumption.  I recommend at least 2 L of fluid daily.    Her right flank pain is a concern.  I do recommend an ultrasound to rule out stone disease or an issue there.  That will be ordered.    Finally I asked her if she desires the InterStim to be replaced and whether she feels comfortable controlling it.  She is very comfortable controlling it even with a smart phone.  She does want it replaced as it did work initially.    All questions were addressed    30 minutes spent on the date of this encounter doing chart review, history and exam, documentation and further activities as noted above.        Yunior Mensah MD   [facility]

## 2024-10-01 NOTE — NURSING NOTE
"Chief Complaint   Patient presents with    Follow Up     Discuss interstim        Initial Pulse 82   Resp 18   Ht 1.549 m (5' 1\")   Wt 77.1 kg (170 lb)   SpO2 98%   BMI 32.12 kg/m   Estimated body mass index is 32.12 kg/m  as calculated from the following:    Height as of this encounter: 1.549 m (5' 1\").    Weight as of this encounter: 77.1 kg (170 lb).  Medication Reconciliation: complete   post void residual - 0 ml    Gloria Chanel LPN    "

## 2024-10-01 NOTE — PROGRESS NOTES
Type of service:  Video Visit   Video Visit Start Time: 1:52  Video Visit End Time: 2:22    Originating Location (pt. Location): [facility]  Distant Location (provider location): [place], [place]  Platform used for Video Visit: Epic Virtual Visit Platform    I have reviewed the note as documented above.  This accurately captures the substance of my virtual visit with the patient. The patient states an understanding and is agreeable with the plan.   Yunior Mensah MD   Urology     It was my pleasure to meet Ms. Gail Perry, a 84 year old year old female seen in consultation for Chief Complaint: Follow Up (Discuss interstim )  .  Urge incontinence    HPI: Ms. Gail Perry is a 84 year old year old female with a history of urge incontinence status post InterStim in April 2021 with Dr. Joy who presents today October 1, 2024 virtually for continued evaluation of her overactive bladder.    Last seen by Dr. Joy in 2021.  Recently seen by our [employer] representative for InterStim management.  She was found to have a nonfunctioning unit and will need a replacement device placed.  This will allow MRI imaging as well.    Device has not been working well for her up.  Some days she is leaking without warning consistent with urge.  No caffeine.     Denies burning or UTI symptoms.  Complains of pain in her right flank area, mild ache, dull in nature, 4/10 and intermittent.      No blood in urine.  No history of kidney stones.    Past Medical History:   Diagnosis Date    Age-related macular degeneration     OD    Anemia     No Comments Provided    Anesthesia of skin     hx difficulty awakening from surgery many years ago (1980)- no problem with recent surgeries    Bunion of great toe of left foot     surgery for right    Chronic obstructive pulmonary disease (H)     mild    Essential (primary) hypertension     No Comments Provided    Frequency of micturition     2006,urinary  incontinence    Gastro-esophageal reflux disease without esophagitis     gastritis    Hereditary and idiopathic neuropathy     ideopathic- chronic pain contract- appts every other month; feet and legs    Herpesviral infection     cold sores - none for years    Hyperlipidemia     No Comments Provided    Hypothyroidism     No Comments Provided    Kidney failure     No Comments Provided    Low back pain     No Comments Provided    Major depressive disorder, single episode     No Comments Provided    Melanocytic nevi of scalp and neck     right neck    Methicillin resistant Staphylococcus aureus infection     8/2011,toe L foot ulcerations    Nontoxic single thyroid nodule     noted on US 2/2012, repeat 11/2012 and stable- no f/u recommended    Osteoarthritis     2012,right femoral head, resection    Other specified disorders of bone density and structure, unspecified site (CODE)     No Comments Provided    Peripheral vascular disease (H)     in bilateral legs    Peripheral vascular disease (H)     No Comments Provided    Personal history of other medical treatment (CODE)     1980,with hysterectomy    Puckering of right macula     No Comments Provided       Past Surgical History:   Procedure Laterality Date    APPENDECTOMY OPEN      No Comments Provided    ARTHROSCOPY KNEE      2008    BUNIONECTOMY      10/06,Right foot- Pawel    CHOLECYSTECTOMY      9/2010,Ara    COLONOSCOPY  02/01/2010 2/05, 2010    ESOPHAGOSCOPY, GASTROSCOPY, DUODENOSCOPY (EGD), COMBINED      9/13/2012,Dr. Main Bullock- for GI bleed    ESOPHAGOSCOPY, GASTROSCOPY, DUODENOSCOPY (EGD), COMBINED N/A 8/29/2019    Procedure: UPPER ENDOSCOPY WITH BIOPSY;  Surgeon: Pepito Burnett MD;  Location: [facility]    HYSTERECTOMY TOTAL ABDOMINAL, BILATERAL SALPINGO-OOPHORECTOMY, COMBINED      1980,bleeding- with hx of blood transfusion    IMPLANT STIMULATOR AND LEADS SACRAL NERVE (STAGE ONE AND TWO) Right 4/27/2021    Procedure: InterStim implant  (nonrechargeable);  Surgeon: Denny Joy MD;  Location: [facility] OR    IMPLANT STIMULATOR SACRAL NERVE PERCUTANEOUS TRIAL Bilateral 4/6/2021    Procedure: Bilateral peripheral nerve evaluation;  Surgeon: Denny Joy MD;  Location: [facility] OR    OTHER SURGICAL HISTORY      5/7/2012,[id number],OTHER,from osteoarthrosis    OTHER SURGICAL HISTORY      9/14/2012,[id number],BIOPSY, gastric mild reactive gastropathy, neg for H-pylori    OTHER SURGICAL HISTORY      4/2001,[id number],PREMALIG/BENIGN SKIN LESION EXCISION,excision nevus right neck    OTHER SURGICAL HISTORY      05/07/2012,[id number],TOTAL HIP ARTHROPLASTY,Right    OTHER SURGICAL HISTORY      08/20/2015,[id number],TOE AMPUTATION,Left,Left foot lesser toe - Gwendolyn, 2/24/2016 - Dr Yee    OTHER SURGICAL HISTORY      SUR5,ANTERIOR AND POSTERIOR VAGINAL REPAIR,x2; done in [facility]       FAMILY HISTORY: Noncontributory    SOCIAL HISTORY:   reports that she has never smoked. She has never used smokeless tobacco.    Current Outpatient Medications   Medication Sig Dispense Refill    acetaminophen (TYLENOL) 325 MG tablet Take 650 mg by mouth every 4 hours as needed       albuterol (PROVENTIL) (2.5 MG/3ML) 0.083% neb solution Take 2.5 mg by nebulization every 4 hours as needed for shortness of breath / dyspnea or wheezing      amitriptyline (ELAVIL) 50 MG tablet Take by mouth At Bedtime      AMLODIPINE BESYLATE PO Take 5 mg by mouth every morning       buPROPion (WELLBUTRIN XL) 150 MG 24 hr tablet Take 150 mg by mouth every morning      diclofenac (VOLTAREN) 1 % topical gel Apply topically 4 times daily      diphenhydrAMINE (BENADRYL) 25 MG capsule Take 25 mg by mouth daily.      ferrous sulfate (FE TABS) 325 (65 Fe) MG EC tablet Take 325 mg by mouth 3 times daily      HYDROcodone-acetaminophen (NORCO) 7.5-325 MG per tablet Take 1 tablet by mouth every 6 hours as needed for moderate to severe pain or severe pain      levothyroxine (SYNTHROID/LEVOTHROID) 100 MCG tablet Take 100 mcg by mouth  "daily       naloxone (NARCAN) 4 MG/0.1ML nasal spray Spray 4 mg into one nostril alternating nostrils once as needed for opioid reversal every 2-3 minutes until assistance arrives      nystatin (MYCOSTATIN) 874999 UNIT/GM external powder Apply topically 2 times daily      oxybutynin (DITROPAN) 5 MG tablet Take by mouth 2 times daily      pantoprazole (PROTONIX) 20 MG EC tablet Take 40 mg by mouth daily      PARoxetine (PAXIL) 40 MG tablet Take 40 mg by mouth every morning       STIMULANT LAXATIVE 8.6-50 MG tablet Take 1 tablet by mouth 2 times daily.         ALLERGIES: Latex, Nickel, Penicillins, and Sulfa antibiotics      GENERAL PHYSICAL EXAM:   Vitals: Pulse 82   Resp 18   Ht 1.549 m (5' 1\")   Wt 77.1 kg (170 lb)   SpO2 98%   BMI 32.12 kg/m    Body mass index is 32.12 kg/m .    GENERAL: Well groomed, well developed, well nourished female in NAD.  HEAD: Normocephalic.   NECK: Neck supple.   ENT: No jaundice   RESPIRATORY: Normal respiratory effort.    MS: visibly moving all four   NEURO: Alert and oriented x 3.  PSYCH: Normal mood and affect, pleasant and agreeable during interview and exam.     PVR: Residual urine by ultrasound was 0 ml.          LABS: The last test results for Ms. Gail Perry were reviewed.   Results for orders placed or performed in visit on 10/01/24 (from the past 24 hour(s))   Basic metabolic panel   Result Value Ref Range    Sodium 144 135 - 145 mmol/L    Potassium 4.0 3.4 - 5.3 mmol/L    Chloride 108 (H) 98 - 107 mmol/L    Carbon Dioxide (CO2) 26 22 - 29 mmol/L    Anion Gap 10 7 - 15 mmol/L    Urea Nitrogen 19.9 8.0 - 23.0 mg/dL    Creatinine 1.35 (H) 0.51 - 0.95 mg/dL    GFR Estimate 39 (L) >60 mL/min/1.73m2    Calcium 9.4 8.8 - 10.4 mg/dL    Glucose 91 70 - 99 mg/dL   Urinalysis Macroscopic   Result Value Ref Range    Color Urine Yellow Colorless, Straw, Light Yellow, Yellow    Appearance Urine Slightly Cloudy (A) Clear    Glucose Urine Negative Negative mg/dL    Bilirubin " Urine Negative Negative    Ketones Urine Negative Negative mg/dL    Specific Gravity Urine 1.022 1.000 - 1.030    Blood Urine Negative Negative    pH Urine 5.5 5.0 - 9.0    Protein Albumin Urine 30 (A) Negative mg/dL    Urobilinogen Urine 2.0 Normal, 2.0 mg/dL    Nitrite Urine Positive (A) Negative    Leukocyte Esterase Urine Large (A) Negative       BMP -   Recent Labs   Lab Test 10/01/24  1256 06/19/19  0000 10/02/17  1207     --  142   POTASSIUM 4.0 5.3 4.2   CHLORIDE 108*  --  107   CO2 26  --  26   BUN 19.9  --  26*   CR 1.35* 1.7* 1.45*   GLC 91 114* 93   CAMRYN 9.4  --  9.3       CBC -   Recent Labs   Lab Test 10/02/17  1132   HGB 10.5*          ASSESSMENT:   Urge incontinence  History of InterStim implantation April 2021  Acute cystitis without hematuria  Right flank pain    PLAN:   Patient with a history of urge incontinence status post InterStim.  Device is not working at this time and that reflects in her treatment and symptom control which has been nonexistent.    Some concern over urinary tract infection.  We will culture her urine nonetheless and treat this in anticipation of upcoming surgery.    We spoke about fluid consumption and the fact that she is putting herself at risk for infection given her poor fluid consumption.  I recommend at least 2 L of fluid daily.    Her right flank pain is a concern.  I do recommend an ultrasound to rule out stone disease or an issue there.  That will be ordered.    Finally I asked her if she desires the InterStim to be replaced and whether she feels comfortable controlling it.  She is very comfortable controlling it even with a smart phone.  She does want it replaced as it did work initially.    All questions were addressed    30 minutes spent on the date of this encounter doing chart review, history and exam, documentation and further activities as noted above.        Yunior Mensah MD   [facility]

## 2024-10-03 ENCOUNTER — TRANSFERRED RECORDS (OUTPATIENT)
Dept: HEALTH INFORMATION MANAGEMENT | Facility: OTHER | Age: 84
End: 2024-10-03
Payer: MEDICARE

## 2024-10-03 ENCOUNTER — TELEPHONE (OUTPATIENT)
Dept: UROLOGY | Facility: OTHER | Age: 84
End: 2024-10-03
Payer: MEDICARE

## 2024-10-03 DIAGNOSIS — N30.00 ACUTE CYSTITIS WITHOUT HEMATURIA: Primary | ICD-10-CM

## 2024-10-03 LAB
BACTERIA UR CULT: ABNORMAL
CREATININE (EXTERNAL): 1.3 MG/DL (ref 0.52–1.04)
GLUCOSE (EXTERNAL): 101 MG/DL (ref 74–100)
POTASSIUM (EXTERNAL): 4.1 MMOL/L (ref 3.4–5)

## 2024-10-03 RX ORDER — LEVOFLOXACIN 250 MG/1
250 TABLET, FILM COATED ORAL DAILY
Qty: 5 TABLET | Refills: 0 | Status: SHIPPED | OUTPATIENT
Start: 2024-10-03 | End: 2024-10-03

## 2024-10-03 RX ORDER — LEVOFLOXACIN 250 MG/1
250 TABLET, FILM COATED ORAL DAILY
Qty: 5 TABLET | Refills: 0 | Status: SHIPPED | OUTPATIENT
Start: 2024-10-03 | End: 2024-10-14

## 2024-10-03 NOTE — TELEPHONE ENCOUNTER
Patient stated that Dr. Mensah told her that he was going to send a prescription for her bladder infection to the [facility] pharmacy for her when she saw him on 10/01/2024. Nothing has been sent so far. Please call patient with any questions.     Latasha Orellana on 10/3/2024 at 9:58 AM

## 2024-10-03 NOTE — RESULT ENCOUNTER NOTE
I am going to place her on Levaquin 250 mg daily for 5 days.  I will send this to her pharmacy.  Can you let her know.  Remind her to drink more fluid.  Thanks Armando

## 2024-10-10 RX ORDER — METHYLPREDNISOLONE 4 MG/1
4 TABLET ORAL SEE ADMIN INSTRUCTIONS
COMMUNITY

## 2024-10-10 RX ORDER — CETIRIZINE HYDROCHLORIDE 10 MG/1
10 TABLET ORAL DAILY
COMMUNITY

## 2024-10-10 RX ORDER — LEVOTHYROXINE SODIUM 112 UG/1
112 TABLET ORAL DAILY
COMMUNITY

## 2024-10-14 ENCOUNTER — ANESTHESIA EVENT (OUTPATIENT)
Dept: SURGERY | Facility: OTHER | Age: 84
End: 2024-10-14
Payer: MEDICARE

## 2024-10-14 RX ORDER — ONDANSETRON 4 MG/1
4 TABLET, ORALLY DISINTEGRATING ORAL EVERY 30 MIN PRN
Status: CANCELLED | OUTPATIENT
Start: 2024-10-14

## 2024-10-14 RX ORDER — DEXAMETHASONE SODIUM PHOSPHATE 4 MG/ML
4 INJECTION, SOLUTION INTRA-ARTICULAR; INTRALESIONAL; INTRAMUSCULAR; INTRAVENOUS; SOFT TISSUE
Status: CANCELLED | OUTPATIENT
Start: 2024-10-14

## 2024-10-14 RX ORDER — ONDANSETRON 2 MG/ML
4 INJECTION INTRAMUSCULAR; INTRAVENOUS EVERY 30 MIN PRN
Status: CANCELLED | OUTPATIENT
Start: 2024-10-14

## 2024-10-14 RX ORDER — OXYCODONE HYDROCHLORIDE 5 MG/1
5 TABLET ORAL
Status: CANCELLED | OUTPATIENT
Start: 2024-10-14

## 2024-10-14 RX ORDER — NALOXONE HYDROCHLORIDE 0.4 MG/ML
0.1 INJECTION, SOLUTION INTRAMUSCULAR; INTRAVENOUS; SUBCUTANEOUS
Status: CANCELLED | OUTPATIENT
Start: 2024-10-14

## 2024-10-14 RX ORDER — OXYCODONE HYDROCHLORIDE 5 MG/1
10 TABLET ORAL
Status: CANCELLED | OUTPATIENT
Start: 2024-10-14

## 2024-10-15 ENCOUNTER — HOSPITAL ENCOUNTER (OUTPATIENT)
Facility: OTHER | Age: 84
Discharge: HOME | End: 2024-10-15
Attending: UROLOGY | Admitting: UROLOGY
Payer: MEDICARE

## 2024-10-15 ENCOUNTER — ANESTHESIA (OUTPATIENT)
Dept: SURGERY | Facility: OTHER | Age: 84
End: 2024-10-15
Payer: MEDICARE

## 2024-10-15 VITALS
SYSTOLIC BLOOD PRESSURE: 160 MMHG | OXYGEN SATURATION: 98 % | WEIGHT: 170 LBS | RESPIRATION RATE: 18 BRPM | BODY MASS INDEX: 32.1 KG/M2 | HEIGHT: 61 IN | DIASTOLIC BLOOD PRESSURE: 114 MMHG | HEART RATE: 71 BPM | TEMPERATURE: 96.9 F

## 2024-10-15 DIAGNOSIS — N39.41 URGE INCONTINENCE: Primary | ICD-10-CM

## 2024-10-15 PROCEDURE — 250N000013 HC RX MED GY IP 250 OP 250 PS 637: Performed by: UROLOGY

## 2024-10-15 RX ORDER — LIDOCAINE 40 MG/G
CREAM TOPICAL
Status: DISCONTINUED | OUTPATIENT
Start: 2024-10-15 | End: 2024-10-15 | Stop reason: HOSPADM

## 2024-10-15 RX ORDER — ACETAMINOPHEN 325 MG/1
975 TABLET ORAL ONCE
Status: COMPLETED | OUTPATIENT
Start: 2024-10-15 | End: 2024-10-15

## 2024-10-15 RX ORDER — ACETAMINOPHEN 650 MG/1
650 SUPPOSITORY RECTAL ONCE
Status: COMPLETED | OUTPATIENT
Start: 2024-10-15 | End: 2024-10-15

## 2024-10-15 RX ADMIN — ACETAMINOPHEN 975 MG: 325 TABLET, FILM COATED ORAL at 06:58

## 2024-10-15 ASSESSMENT — ACTIVITIES OF DAILY LIVING (ADL)
ADLS_ACUITY_SCORE: 35
ADLS_ACUITY_SCORE: 39
ADLS_ACUITY_SCORE: 39

## 2024-10-15 NOTE — INTERVAL H&P NOTE
"I have reviewed the History and physical with the noted changes.     After discussion it was noted that patient has an open sacral wound consistent with decubitus ulceration.  In addition, patient with what appears to be an ID reaction total body.    Recommend cancellation of the case with referral back to PMD for wound cultures and more definitive treatment.  Will not consider replacement until area is healed completely and cultures are negative.    Patient voices an understanding.     General: Alert, no apparent distress  HEENT: Normal  CV: RRR  Respiratory: CTA B  Buttocks:  Sacral wound left inner buttocks, 2 cm, ulcerated, erythematous  Extremities: Moving all 4  Neuro: Alert and oriented x 3  Psych: Normal affect pleasant  Skin:  Multiple scabbed lesions total body concerning for ID reaction       Clinical Conditions Present on Arrival:  Clinically Significant Risk Factors Present on Admission          # Hyperchloremia: Highest Cl = 108 mmol/L in last 30 days, will monitor as appropriate                  # Obesity: Estimated body mass index is 32.12 kg/m  as calculated from the following:    Height as of 10/1/24: 1.549 m (5' 1\").    Weight as of 10/1/24: 77.1 kg (170 lb).       "

## 2024-10-15 NOTE — INTERVAL H&P NOTE
"I have reviewed the surgical (or preoperative) H&P that is linked to this encounter, and examined the patient. There are no significant changes    General: Alert, no apparent distress  HEENT: No jaundice, mucous membranes moist  CV: RRR  Respiratory: CTA B  Extremities: Moving all 4  Neuro: Alert and oriented x 3  Psych: Normal affect pleasant      Clinical Conditions Present on Arrival:  Clinically Significant Risk Factors Present on Admission          # Hyperchloremia: Highest Cl = 108 mmol/L in last 30 days, will monitor as appropriate                  # Obesity: Estimated body mass index is 32.12 kg/m  as calculated from the following:    Height as of 10/1/24: 1.549 m (5' 1\").    Weight as of 10/1/24: 77.1 kg (170 lb).       "

## 2024-10-15 NOTE — OR NURSING
Pt arrivals with full body rash in multiple stages of healing covering arms, legs, and torso.  Two large open draining ulcers noted on her buttocks with discoloration covering nearly the whole buttocks, non blanchable.  Per patient she has been using creams at home with little to no change.  Surgeon aware, assessed, and cancel the case till ulcer is healed.   notified and pt was discharged home to follow-up with primary. Magdalene Correa RN on 10/15/2024 at 8:20 AM

## 2024-10-24 ENCOUNTER — HOSPITAL ENCOUNTER (OUTPATIENT)
Dept: ULTRASOUND IMAGING | Facility: OTHER | Age: 84
Discharge: HOME | End: 2024-10-24
Attending: UROLOGY | Admitting: UROLOGY
Payer: MEDICARE

## 2024-10-24 DIAGNOSIS — R10.9 RIGHT FLANK PAIN: ICD-10-CM

## 2024-10-24 PROCEDURE — 76770 US EXAM ABDO BACK WALL COMP: CPT

## 2024-10-28 NOTE — RESULT ENCOUNTER NOTE
Can we let Gail know her ultrasound is normal and her flank pain is not from her kidneys.  Thanks, Armando

## (undated) DEVICE — COVER LIGHT HANDLE LT-F02

## (undated) DEVICE — IRRIGATION-H2O 1000ML

## (undated) DEVICE — NDL 25GA 1.5" 305127

## (undated) DEVICE — COVER ULTRASOUND PROBE 1X9" LF 25040

## (undated) DEVICE — SU MONOCRYL 4-0 PS-2 27" UND Y426H

## (undated) DEVICE — DECANTER VIAL 2006S

## (undated) DEVICE — Device

## (undated) DEVICE — PACK BASIC SET UP STERILE 88178

## (undated) DEVICE — DRAPE C-ARM PACK 9"

## (undated) DEVICE — MOUTHPIECE W/GUARD FOR ENDOSCOPY

## (undated) DEVICE — SOL WATER 1500ML

## (undated) DEVICE — STIMULATOR EXTERNAL VERIFY MEDTRONIC INTERSTIM 353101

## (undated) DEVICE — SYR BULB IRRIG 50ML LATEX FREE 0035280

## (undated) DEVICE — NDL COUNTER 20CT 31142493

## (undated) DEVICE — SPONGE RAY-TEC 4X4" 7317

## (undated) DEVICE — SYRINGE-30CC SLIP TIP

## (undated) DEVICE — CONNECTOR-ERBEFLO 2 PORT

## (undated) DEVICE — TUBING-SUCTION 20FT

## (undated) DEVICE — PACK MAJOR LAPAROTOMY LF SBA15MLFCA

## (undated) DEVICE — KIT ACCESSORY PERIPHERAL LEAD INTR 3550-18

## (undated) DEVICE — FORCEP-COLON BIOPSY STD W/NEEDLE 160CM

## (undated) DEVICE — PREP CHLORAPREP 26ML TINTED ORANGE  260815

## (undated) DEVICE — GLOVE ESTEEM POWDER FREE SMT 8.5 2D72PT85

## (undated) DEVICE — BLADE KNIFE SURG 11 371111

## (undated) DEVICE — DRAPE LAP W/ARMBOARD 29410

## (undated) DEVICE — DRAPE IOBAN INCISE 23X17" 6650EZ

## (undated) DEVICE — DRSG STERI STRIP 1/2X4" R1547

## (undated) DEVICE — PROGRAMMER MEDT SMART HANDSET W/COMMUNICATOR TH90G01

## (undated) DEVICE — DRAPE C-ARMOR 5 SIDED 5523

## (undated) DEVICE — SPECIMEN CONTAINER CLIKSEAL 90ML DYND30382

## (undated) DEVICE — ADH SKIN CLOSURE PREMIERPRO EXOFIN 1.0ML 3470

## (undated) DEVICE — TOWEL SURG BLUE STERILE DISP MDT2168204

## (undated) DEVICE — SU VICRYL 3-0 CT-2 27" UND J232H

## (undated) DEVICE — GOWN XLG XLONG DISP LF DYNJP2302P

## (undated) DEVICE — SYR 10ML LL W/O NDL

## (undated) DEVICE — LUBRICANT JELLY 2OZ. TUBE

## (undated) RX ORDER — PROPOFOL 10 MG/ML
INJECTION, EMULSION INTRAVENOUS
Status: DISPENSED
Start: 2021-04-06

## (undated) RX ORDER — LIDOCAINE HYDROCHLORIDE 20 MG/ML
INJECTION, SOLUTION EPIDURAL; INFILTRATION; INTRACAUDAL; PERINEURAL
Status: DISPENSED
Start: 2021-04-06

## (undated) RX ORDER — ONDANSETRON 2 MG/ML
INJECTION INTRAMUSCULAR; INTRAVENOUS
Status: DISPENSED
Start: 2024-10-15

## (undated) RX ORDER — GLYCOPYRROLATE 0.2 MG/ML
INJECTION, SOLUTION INTRAMUSCULAR; INTRAVENOUS
Status: DISPENSED
Start: 2021-04-27

## (undated) RX ORDER — REGADENOSON 0.08 MG/ML
INJECTION, SOLUTION INTRAVENOUS
Status: DISPENSED
Start: 2018-07-02

## (undated) RX ORDER — CEFAZOLIN SODIUM 1 G/3ML
INJECTION, POWDER, FOR SOLUTION INTRAMUSCULAR; INTRAVENOUS
Status: DISPENSED
Start: 2021-04-27

## (undated) RX ORDER — CEFAZOLIN SODIUM 1 G/3ML
INJECTION, POWDER, FOR SOLUTION INTRAMUSCULAR; INTRAVENOUS
Status: DISPENSED
Start: 2024-10-15

## (undated) RX ORDER — CEFAZOLIN SODIUM 2 G/100ML
INJECTION, SOLUTION INTRAVENOUS
Status: DISPENSED
Start: 2021-04-06

## (undated) RX ORDER — LIDOCAINE HYDROCHLORIDE AND EPINEPHRINE 10; 10 MG/ML; UG/ML
INJECTION, SOLUTION INFILTRATION; PERINEURAL
Status: DISPENSED
Start: 2024-10-15

## (undated) RX ORDER — CEFUROXIME AXETIL 250 MG/1
TABLET ORAL
Status: DISPENSED
Start: 2021-02-17

## (undated) RX ORDER — ACETAMINOPHEN 325 MG/1
TABLET ORAL
Status: DISPENSED
Start: 2024-10-15

## (undated) RX ORDER — PROPOFOL 10 MG/ML
INJECTION, EMULSION INTRAVENOUS
Status: DISPENSED
Start: 2024-10-15

## (undated) RX ORDER — HYDROCODONE BITARTRATE AND ACETAMINOPHEN 7.5; 325 MG/15ML; MG/15ML
SOLUTION ORAL
Status: DISPENSED
Start: 2021-04-27

## (undated) RX ORDER — ONDANSETRON 2 MG/ML
INJECTION INTRAMUSCULAR; INTRAVENOUS
Status: DISPENSED
Start: 2021-04-27

## (undated) RX ORDER — LIDOCAINE HYDROCHLORIDE AND EPINEPHRINE 10; 10 MG/ML; UG/ML
INJECTION, SOLUTION INFILTRATION; PERINEURAL
Status: DISPENSED
Start: 2021-04-27

## (undated) RX ORDER — LIDOCAINE HYDROCHLORIDE AND EPINEPHRINE 10; 10 MG/ML; UG/ML
INJECTION, SOLUTION INFILTRATION; PERINEURAL
Status: DISPENSED
Start: 2021-04-06

## (undated) RX ORDER — FENTANYL CITRATE 50 UG/ML
INJECTION, SOLUTION INTRAMUSCULAR; INTRAVENOUS
Status: DISPENSED
Start: 2024-10-15

## (undated) RX ORDER — PROPOFOL 10 MG/ML
INJECTION, EMULSION INTRAVENOUS
Status: DISPENSED
Start: 2021-04-27